# Patient Record
Sex: MALE | Race: WHITE | NOT HISPANIC OR LATINO | URBAN - METROPOLITAN AREA
[De-identification: names, ages, dates, MRNs, and addresses within clinical notes are randomized per-mention and may not be internally consistent; named-entity substitution may affect disease eponyms.]

---

## 2023-02-27 PROBLEM — F32.A ANXIETY AND DEPRESSION: Status: ACTIVE | Noted: 2023-02-27

## 2023-02-27 PROBLEM — E78.5 HYPERLIPIDEMIA: Status: ACTIVE | Noted: 2023-02-27

## 2023-02-27 PROBLEM — E66.9 CLASS 2 OBESITY WITH BODY MASS INDEX (BMI) OF 38.0 TO 38.9 IN ADULT: Status: ACTIVE | Noted: 2023-02-27

## 2023-02-27 PROBLEM — E66.812 CLASS 2 OBESITY WITH BODY MASS INDEX (BMI) OF 38.0 TO 38.9 IN ADULT: Status: ACTIVE | Noted: 2023-02-27

## 2023-02-27 PROBLEM — I10 BENIGN ESSENTIAL HYPERTENSION: Status: ACTIVE | Noted: 2023-02-27

## 2023-02-27 PROBLEM — F41.9 ANXIETY AND DEPRESSION: Status: ACTIVE | Noted: 2023-02-27

## 2023-02-27 PROBLEM — E55.9 VITAMIN D DEFICIENCY: Status: ACTIVE | Noted: 2023-02-27

## 2023-02-27 PROBLEM — G47.33 OBSTRUCTIVE SLEEP APNEA: Status: ACTIVE | Noted: 2023-02-27

## 2023-02-27 RX ORDER — ATORVASTATIN CALCIUM 10 MG/1
1 TABLET, FILM COATED ORAL NIGHTLY
COMMUNITY
Start: 2021-10-26 | End: 2023-05-11 | Stop reason: SDUPTHER

## 2023-02-27 RX ORDER — PHENTERMINE HYDROCHLORIDE 37.5 MG/1
37.5 TABLET ORAL EVERY MORNING
COMMUNITY
End: 2023-03-10 | Stop reason: SDUPTHER

## 2023-02-27 RX ORDER — ALPRAZOLAM 0.5 MG/1
0.5 TABLET ORAL DAILY PRN
COMMUNITY
End: 2023-08-14 | Stop reason: ALTCHOICE

## 2023-02-27 RX ORDER — ERGOCALCIFEROL 1.25 MG/1
50000 CAPSULE ORAL
COMMUNITY
Start: 2021-10-26 | End: 2023-05-11 | Stop reason: SDUPTHER

## 2023-02-27 RX ORDER — LISINOPRIL AND HYDROCHLOROTHIAZIDE 10; 12.5 MG/1; MG/1
1 TABLET ORAL EVERY MORNING
COMMUNITY
End: 2023-05-11 | Stop reason: SDUPTHER

## 2023-03-10 ENCOUNTER — OFFICE VISIT (OUTPATIENT)
Dept: PRIMARY CARE | Facility: CLINIC | Age: 48
End: 2023-03-10
Payer: COMMERCIAL

## 2023-03-10 VITALS
HEART RATE: 85 BPM | HEIGHT: 70 IN | OXYGEN SATURATION: 98 % | WEIGHT: 256 LBS | BODY MASS INDEX: 36.65 KG/M2 | SYSTOLIC BLOOD PRESSURE: 136 MMHG | TEMPERATURE: 97.5 F | DIASTOLIC BLOOD PRESSURE: 82 MMHG

## 2023-03-10 DIAGNOSIS — E55.9 VITAMIN D DEFICIENCY: ICD-10-CM

## 2023-03-10 DIAGNOSIS — I10 BENIGN ESSENTIAL HYPERTENSION: ICD-10-CM

## 2023-03-10 DIAGNOSIS — E66.09 CLASS 2 OBESITY DUE TO EXCESS CALORIES WITHOUT SERIOUS COMORBIDITY WITH BODY MASS INDEX (BMI) OF 38.0 TO 38.9 IN ADULT: Primary | ICD-10-CM

## 2023-03-10 DIAGNOSIS — E78.2 MIXED HYPERLIPIDEMIA: ICD-10-CM

## 2023-03-10 DIAGNOSIS — G47.33 OBSTRUCTIVE SLEEP APNEA: ICD-10-CM

## 2023-03-10 PROBLEM — F32.A ANXIETY AND DEPRESSION: Status: RESOLVED | Noted: 2023-02-27 | Resolved: 2023-03-10

## 2023-03-10 PROBLEM — F41.9 ANXIETY AND DEPRESSION: Status: RESOLVED | Noted: 2023-02-27 | Resolved: 2023-03-10

## 2023-03-10 PROCEDURE — 99213 OFFICE O/P EST LOW 20 MIN: CPT | Performed by: INTERNAL MEDICINE

## 2023-03-10 PROCEDURE — 3075F SYST BP GE 130 - 139MM HG: CPT | Performed by: INTERNAL MEDICINE

## 2023-03-10 PROCEDURE — 1036F TOBACCO NON-USER: CPT | Performed by: INTERNAL MEDICINE

## 2023-03-10 PROCEDURE — 3008F BODY MASS INDEX DOCD: CPT | Performed by: INTERNAL MEDICINE

## 2023-03-10 PROCEDURE — 3079F DIAST BP 80-89 MM HG: CPT | Performed by: INTERNAL MEDICINE

## 2023-03-10 RX ORDER — PHENTERMINE HYDROCHLORIDE 37.5 MG/1
37.5 TABLET ORAL EVERY MORNING
Qty: 30 TABLET | Refills: 0 | Status: SHIPPED | OUTPATIENT
Start: 2023-03-10 | End: 2023-04-06 | Stop reason: SDUPTHER

## 2023-03-10 ASSESSMENT — ENCOUNTER SYMPTOMS
DIARRHEA: 0
FEVER: 0
SHORTNESS OF BREATH: 0
NAUSEA: 0
WHEEZING: 0
VOMITING: 0
FATIGUE: 0
CHILLS: 0
PALPITATIONS: 0
CONSTIPATION: 0
COUGH: 0
SORE THROAT: 0

## 2023-03-10 NOTE — ASSESSMENT & PLAN NOTE
Advice your vitamin D level is low take vitamin C calcium plus vitamin D 50,000 unit one every week for 3 months and repeat testing or 3 months

## 2023-03-10 NOTE — ASSESSMENT & PLAN NOTE
Sleep apnea test endocrine work-up 1800-calorie diet 10,000 steps per day Adipex I personally reviewed the OARRS report for this patient. This report is scanned into the electronic medical record. I have considered  the risks of abuse, dependence, addiction and diversion. After discussion, patient does have a good understanding of the safety and  risks of this medication. I believe that it is clinically appropriate for this patient to be prescribed this medication.  See patient back in 6 weeks.

## 2023-03-10 NOTE — ASSESSMENT & PLAN NOTE
Low-salt diet continue lisinopril hydrochlorothiazide 10/12.5 watch for potassium uric acid BMP every 6 months

## 2023-03-10 NOTE — PROGRESS NOTES
Subjective   Patient ID: Calderon Hernandez is a 47 y.o. male who presents for Establish Care (Adipex-P refill ).    Assessment/Plan   Problem List Items Addressed This Visit          Nervous    Obstructive sleep apnea     Advised weight reduction CPAP            Circulatory    Benign essential hypertension     Low-salt diet continue lisinopril hydrochlorothiazide 10/12.5 watch for potassium uric acid BMP every 6 months            Endocrine/Metabolic    Class 2 obesity with body mass index (BMI) of 38.0 to 38.9 in adult - Primary     Sleep apnea test endocrine work-up 1800-calorie diet 10,000 steps per day Adipex I personally reviewed the OARRS report for this patient. This report is scanned into the electronic medical record. I have considered  the risks of abuse, dependence, addiction and diversion. After discussion, patient does have a good understanding of the safety and  risks of this medication. I believe that it is clinically appropriate for this patient to be prescribed this medication.  See patient back in 6 weeks.         Vitamin D deficiency     Advice your vitamin D level is low take vitamin C calcium plus vitamin D 50,000 unit one every week for 3 months and repeat testing or 3 months            Other    Hyperlipidemia     Continue Lipitor 10 mg a day low-fat diet            Source of history: Nurse, Medical personnel, Medical record, Patient.  History limitation: None.    HPI obesity arthritis hypertension hyperlipidemia sleep apnea with help with the diet exercise medication successfully lost 10 pounds fill feeling better    Negative for hypoxia headache    Negative for fall headache or chest pain or COVID-19    No Known Allergies    Current Outpatient Medications   Medication Sig Dispense Refill    ALPRAZolam (Xanax) 0.5 mg tablet Take 1 tablet (0.5 mg) by mouth once daily as needed.      atorvastatin (Lipitor) 10 mg tablet Take 1 tablet (10 mg) by mouth once daily at bedtime.      ergocalciferol  "(Vitamin D-2) 1.25 MG (66367 UT) capsule Take 1 capsule (50,000 Units) by mouth 1 (one) time per week.      lisinopriL-hydrochlorothiazide 10-12.5 mg tablet Take 1 tablet by mouth once daily in the morning.      phentermine (Adipex-P) 37.5 mg tablet Take 1 tablet (37.5 mg) by mouth once daily in the morning.       No current facility-administered medications for this visit.       Objective   Visit Vitals  /82 (BP Location: Right arm, Patient Position: Sitting)   Pulse 85   Temp 36.4 °C (97.5 °F)   Ht 1.778 m (5' 10\")   Wt 116 kg (256 lb)   SpO2 98%   BMI 36.73 kg/m²   Smoking Status Former   BSA 2.39 m²     Physical Exam  Constitutional:       General: He is not in acute distress.     Appearance: Normal appearance.   HENT:      Head: Normocephalic and atraumatic.      Nose: Nose normal.   Eyes:      Extraocular Movements: Extraocular movements intact.      Conjunctiva/sclera: Conjunctivae normal.   Cardiovascular:      Rate and Rhythm: Normal rate and regular rhythm.   Pulmonary:      Effort: Pulmonary effort is normal.      Breath sounds: Normal breath sounds.   Skin:     General: Skin is warm.   Neurological:      Mental Status: He is alert and oriented to person, place, and time.   Psychiatric:         Mood and Affect: Mood normal.         Behavior: Behavior normal.       Review of Systems   Constitutional:  Negative for chills, fatigue and fever.   HENT:  Negative for congestion, ear pain and sore throat.    Respiratory:  Negative for cough, shortness of breath and wheezing.    Cardiovascular:  Negative for chest pain, palpitations and leg swelling.   Gastrointestinal:  Negative for constipation, diarrhea, nausea and vomiting.       Legacy Encounter on 01/09/2023   Component Date Value Ref Range Status    Vitamin D, 25-Hydroxy 01/09/2023 19 (A)  ng/mL Final    PSA 01/09/2023 1.79  0.00 - 4.00 ng/mL Final    TSH 01/09/2023 1.39  0.44 - 3.98 mIU/L Final    Color, Urine 01/09/2023 STRAW  STRAW,YELLOW Final    " Appearance, Urine 01/09/2023 CLEAR  CLEAR Final    Specific Gravity, Urine 01/09/2023 1.003 (L)  1.005 - 1.035 Final    pH, Urine 01/09/2023 6.0  5.0 - 8.0 Final    Protein, Urine 01/09/2023 NEGATIVE  NEGATIVE mg/dL Final    Glucose, Urine 01/09/2023 NEGATIVE  NEGATIVE mg/dL Final    Blood, Urine 01/09/2023 NEGATIVE  NEGATIVE Final    Ketones, Urine 01/09/2023 NEGATIVE  NEGATIVE mg/dL Final    Bilirubin, Urine 01/09/2023 NEGATIVE  NEGATIVE Final    Urobilinogen, Urine 01/09/2023 <2.0  0.0 - 1.9 mg/dL Final    Nitrite, Urine 01/09/2023 NEGATIVE  NEGATIVE Final    Leukocyte Esterase, Urine 01/09/2023 NEGATIVE  NEGATIVE Final    Glucose 01/09/2023 93  74 - 99 mg/dL Final    Sodium 01/09/2023 139  136 - 145 mmol/L Final    Potassium 01/09/2023 4.3  3.5 - 5.3 mmol/L Final    Chloride 01/09/2023 102  98 - 107 mmol/L Final    Bicarbonate 01/09/2023 29  21 - 32 mmol/L Final    Anion Gap 01/09/2023 12  10 - 20 mmol/L Final    Urea Nitrogen 01/09/2023 15  6 - 23 mg/dL Final    Creatinine 01/09/2023 0.98  0.50 - 1.30 mg/dL Final    GFR MALE 01/09/2023 >90  >90 mL/min/1.73m2 Final    Calcium 01/09/2023 9.9  8.6 - 10.6 mg/dL Final    Albumin 01/09/2023 5.1 (H)  3.4 - 5.0 g/dL Final    Alkaline Phosphatase 01/09/2023 53  33 - 120 U/L Final    Total Protein 01/09/2023 7.5  6.4 - 8.2 g/dL Final    AST 01/09/2023 17  9 - 39 U/L Final    Total Bilirubin 01/09/2023 0.6  0.0 - 1.2 mg/dL Final    ALT (SGPT) 01/09/2023 30  10 - 52 U/L Final    WBC 01/09/2023 6.3  4.4 - 11.3 x10E9/L Final    nRBC 01/09/2023 0.0  0.0 - 0.0 /100 WBC Final    RBC 01/09/2023 5.35  4.50 - 5.90 x10E12/L Final    Hemoglobin 01/09/2023 16.2  13.5 - 17.5 g/dL Final    Hematocrit 01/09/2023 48.9  41.0 - 52.0 % Final    MCV 01/09/2023 91  80 - 100 fL Final    MCHC 01/09/2023 33.1  32.0 - 36.0 g/dL Final    Platelets 01/09/2023 260  150 - 450 x10E9/L Final    RDW 01/09/2023 12.4  11.5 - 14.5 % Final    Neutrophils % 01/09/2023 56.7  40.0 - 80.0 % Final    Immature  Granulocytes %, Automated 01/09/2023 0.5  0.0 - 0.9 % Final    Lymphocytes % 01/09/2023 31.1  13.0 - 44.0 % Final    Monocytes % 01/09/2023 8.8  2.0 - 10.0 % Final    Eosinophils % 01/09/2023 2.1  0.0 - 6.0 % Final    Basophils % 01/09/2023 0.8  0.0 - 2.0 % Final    Neutrophils Absolute 01/09/2023 3.57  1.20 - 7.70 x10E9/L Final    Lymphocytes Absolute 01/09/2023 1.95  1.20 - 4.80 x10E9/L Final    Monocytes Absolute 01/09/2023 0.55  0.10 - 1.00 x10E9/L Final    Eosinophils Absolute 01/09/2023 0.13  0.00 - 0.70 x10E9/L Final    Basophils Absolute 01/09/2023 0.05  0.00 - 0.10 x10E9/L Final    Cholesterol 01/09/2023 152  0 - 199 mg/dL Final    HDL 01/09/2023 55.0  mg/dL Final    Cholesterol/HDL Ratio 01/09/2023 2.8   Final    LDL 01/09/2023 72  0 - 99 mg/dL Final    VLDL 01/09/2023 25  0 - 40 mg/dL Final    Triglycerides 01/09/2023 126  0 - 149 mg/dL Final    ALBUMIN (MG/L) IN URINE 01/09/2023 <7.0  Not Established mg/L Final    Albumin/Creatine Ratio 01/09/2023 SEE COMMENT  0.0 - 30.0 ug/mg crt Final    Creatinine, Urine 01/09/2023 20.4  20.0 - 370.0 mg/dL Final    Hemoglobin A1C 01/09/2023 5.4  % Final    Estimated Average Glucose 01/09/2023 108  MG/DL Final       Radiology: Reviewed imaging in powerchart.  No results found.    Family History   Problem Relation Name Age of Onset    Stroke Mother      Other (vascular disorder) Father       Social History     Socioeconomic History    Marital status: Unknown     Spouse name: None    Number of children: None    Years of education: None    Highest education level: None   Occupational History    None   Tobacco Use    Smoking status: Former     Types: Cigarettes    Smokeless tobacco: Never   Vaping Use    Vaping status: None   Substance and Sexual Activity    Alcohol use: Yes     Comment: social    Drug use: Never    Sexual activity: None   Other Topics Concern    None   Social History Narrative    None     Social Determinants of Health     Financial Resource Strain: Not on  file   Food Insecurity: Not on file   Transportation Needs: Not on file   Physical Activity: Not on file   Stress: Not on file   Social Connections: Not on file   Intimate Partner Violence: Not on file   Housing Stability: Not on file     Past Medical History:   Diagnosis Date    Personal history of other endocrine, nutritional and metabolic disease 10/25/2021    History of obesity    Unspecified asthma, uncomplicated 10/25/2021    Acute asthmatic bronchitis     Past Surgical History:   Procedure Laterality Date    OTHER SURGICAL HISTORY  10/25/2021    Humerus fracture repair    OTHER SURGICAL HISTORY  10/25/2021    Inguinal hernia repair       Charting was completed using voice recognition technology and may include unintended errors.

## 2023-04-06 ENCOUNTER — OFFICE VISIT (OUTPATIENT)
Dept: PRIMARY CARE | Facility: CLINIC | Age: 48
End: 2023-04-06
Payer: COMMERCIAL

## 2023-04-06 VITALS
DIASTOLIC BLOOD PRESSURE: 82 MMHG | TEMPERATURE: 97.3 F | BODY MASS INDEX: 34.79 KG/M2 | HEIGHT: 70 IN | WEIGHT: 243 LBS | HEART RATE: 110 BPM | OXYGEN SATURATION: 98 % | SYSTOLIC BLOOD PRESSURE: 127 MMHG

## 2023-04-06 DIAGNOSIS — E55.9 VITAMIN D DEFICIENCY: ICD-10-CM

## 2023-04-06 DIAGNOSIS — E66.09 CLASS 2 OBESITY DUE TO EXCESS CALORIES WITHOUT SERIOUS COMORBIDITY WITH BODY MASS INDEX (BMI) OF 38.0 TO 38.9 IN ADULT: Primary | ICD-10-CM

## 2023-04-06 DIAGNOSIS — I10 BENIGN ESSENTIAL HYPERTENSION: ICD-10-CM

## 2023-04-06 PROBLEM — F51.02 ADJUSTMENT INSOMNIA: Status: ACTIVE | Noted: 2023-04-06

## 2023-04-06 PROBLEM — M77.9 TENDONITIS: Status: RESOLVED | Noted: 2017-10-19 | Resolved: 2023-04-06

## 2023-04-06 PROBLEM — R53.82 CHRONIC FATIGUE: Status: RESOLVED | Noted: 2023-04-06 | Resolved: 2023-04-06

## 2023-04-06 PROBLEM — S42.301A FRACTURE OF HUMERAL SHAFT, RIGHT, CLOSED: Status: RESOLVED | Noted: 2019-11-02 | Resolved: 2023-04-06

## 2023-04-06 PROBLEM — S32.009A CLOSED FRACTURE OF TRANSVERSE PROCESS OF LUMBAR VERTEBRA (MULTI): Status: RESOLVED | Noted: 2019-11-03 | Resolved: 2023-04-06

## 2023-04-06 PROBLEM — M72.2 PLANTAR FASCIITIS: Status: RESOLVED | Noted: 2017-10-19 | Resolved: 2023-04-06

## 2023-04-06 PROBLEM — S93.491A SPRAIN OF ANTERIOR TALOFIBULAR LIGAMENT OF RIGHT ANKLE: Status: RESOLVED | Noted: 2017-10-19 | Resolved: 2023-04-06

## 2023-04-06 PROBLEM — B35.3 TINEA PEDIS OF RIGHT FOOT: Status: RESOLVED | Noted: 2018-03-26 | Resolved: 2023-04-06

## 2023-04-06 PROCEDURE — 3008F BODY MASS INDEX DOCD: CPT | Performed by: INTERNAL MEDICINE

## 2023-04-06 PROCEDURE — 3079F DIAST BP 80-89 MM HG: CPT | Performed by: INTERNAL MEDICINE

## 2023-04-06 PROCEDURE — 99213 OFFICE O/P EST LOW 20 MIN: CPT | Performed by: INTERNAL MEDICINE

## 2023-04-06 PROCEDURE — 1036F TOBACCO NON-USER: CPT | Performed by: INTERNAL MEDICINE

## 2023-04-06 PROCEDURE — 3074F SYST BP LT 130 MM HG: CPT | Performed by: INTERNAL MEDICINE

## 2023-04-06 RX ORDER — PHENTERMINE HYDROCHLORIDE 37.5 MG/1
37.5 TABLET ORAL EVERY MORNING
Qty: 30 TABLET | Refills: 0 | Status: SHIPPED | OUTPATIENT
Start: 2023-04-06 | End: 2023-05-11 | Stop reason: ALTCHOICE

## 2023-04-06 NOTE — ASSESSMENT & PLAN NOTE
Patients BP readings reviewed and addressed, as we age our arteries turn stiffer and less elastic. Restricting salt consumption and staying physically fit with regular exercise regimen is the only way to keep our vasculature less tonic. Studies have shown that keeping ideal body wt, exercise routine about 140 to 150 minutes a week, eating variety of plant based diet and drinking plentiful water are quite helpful. Monitor BP twice or once a week at home and bring log to be reviewed by me. Uncontrolled BP has long term consequences including heart failure, myocardial infarction, accelerated atherosclerosis and kidney dysfunction. Therapy reviewed and explained.

## 2023-04-06 NOTE — ASSESSMENT & PLAN NOTE
I personally reviewed the OARRS report for this patient. This report is scanned into the electronic medical record. I have considered  the risks of abuse, dependence, addiction and diversion. After discussion, patient does have a good understanding of the safety and  risks of this medication. I believe that it is clinically appropriate for this patient to be prescribed this medication.  See patient back in 6 weeks.

## 2023-05-11 ENCOUNTER — LAB (OUTPATIENT)
Dept: LAB | Facility: LAB | Age: 48
End: 2023-05-11
Payer: COMMERCIAL

## 2023-05-11 ENCOUNTER — OFFICE VISIT (OUTPATIENT)
Dept: PRIMARY CARE | Facility: CLINIC | Age: 48
End: 2023-05-11
Payer: COMMERCIAL

## 2023-05-11 VITALS
TEMPERATURE: 97.6 F | OXYGEN SATURATION: 98 % | DIASTOLIC BLOOD PRESSURE: 84 MMHG | SYSTOLIC BLOOD PRESSURE: 131 MMHG | HEIGHT: 70 IN | BODY MASS INDEX: 34.65 KG/M2 | WEIGHT: 242 LBS | HEART RATE: 81 BPM

## 2023-05-11 DIAGNOSIS — E66.09 CLASS 2 OBESITY DUE TO EXCESS CALORIES WITHOUT SERIOUS COMORBIDITY WITH BODY MASS INDEX (BMI) OF 38.0 TO 38.9 IN ADULT: ICD-10-CM

## 2023-05-11 DIAGNOSIS — E66.09 CLASS 2 OBESITY DUE TO EXCESS CALORIES WITHOUT SERIOUS COMORBIDITY WITH BODY MASS INDEX (BMI) OF 38.0 TO 38.9 IN ADULT: Primary | ICD-10-CM

## 2023-05-11 DIAGNOSIS — E55.9 VITAMIN D DEFICIENCY: ICD-10-CM

## 2023-05-11 DIAGNOSIS — R73.9 HYPERGLYCEMIA: ICD-10-CM

## 2023-05-11 DIAGNOSIS — I10 HYPERTENSION, UNSPECIFIED TYPE: ICD-10-CM

## 2023-05-11 DIAGNOSIS — E78.2 MIXED HYPERLIPIDEMIA: ICD-10-CM

## 2023-05-11 DIAGNOSIS — I10 BENIGN ESSENTIAL HYPERTENSION: ICD-10-CM

## 2023-05-11 PROBLEM — G47.33 OBSTRUCTIVE SLEEP APNEA: Status: RESOLVED | Noted: 2023-02-27 | Resolved: 2023-05-11

## 2023-05-11 PROBLEM — F51.02 ADJUSTMENT INSOMNIA: Status: RESOLVED | Noted: 2023-04-06 | Resolved: 2023-05-11

## 2023-05-11 LAB
ESTIMATED AVERAGE GLUCOSE FOR HBA1C: 100 MG/DL
HEMOGLOBIN A1C/HEMOGLOBIN TOTAL IN BLOOD: 5.1 %

## 2023-05-11 PROCEDURE — 3075F SYST BP GE 130 - 139MM HG: CPT | Performed by: INTERNAL MEDICINE

## 2023-05-11 PROCEDURE — 36415 COLL VENOUS BLD VENIPUNCTURE: CPT

## 2023-05-11 PROCEDURE — 83036 HEMOGLOBIN GLYCOSYLATED A1C: CPT

## 2023-05-11 PROCEDURE — 82306 VITAMIN D 25 HYDROXY: CPT

## 2023-05-11 PROCEDURE — 99214 OFFICE O/P EST MOD 30 MIN: CPT | Performed by: INTERNAL MEDICINE

## 2023-05-11 PROCEDURE — 3079F DIAST BP 80-89 MM HG: CPT | Performed by: INTERNAL MEDICINE

## 2023-05-11 PROCEDURE — 1036F TOBACCO NON-USER: CPT | Performed by: INTERNAL MEDICINE

## 2023-05-11 PROCEDURE — 80307 DRUG TEST PRSMV CHEM ANLYZR: CPT

## 2023-05-11 PROCEDURE — 3008F BODY MASS INDEX DOCD: CPT | Performed by: INTERNAL MEDICINE

## 2023-05-11 RX ORDER — METFORMIN HYDROCHLORIDE 500 MG/1
500 TABLET ORAL
Qty: 30 TABLET | Refills: 11 | Status: SHIPPED | OUTPATIENT
Start: 2023-05-11 | End: 2023-12-12 | Stop reason: SDUPTHER

## 2023-05-11 RX ORDER — LISINOPRIL AND HYDROCHLOROTHIAZIDE 10; 12.5 MG/1; MG/1
1 TABLET ORAL EVERY MORNING
Qty: 90 TABLET | Refills: 3 | Status: SHIPPED | OUTPATIENT
Start: 2023-05-11 | End: 2023-08-14 | Stop reason: ALTCHOICE

## 2023-05-11 RX ORDER — ERGOCALCIFEROL 1.25 MG/1
50000 CAPSULE ORAL
Qty: 12 CAPSULE | Refills: 0 | Status: SHIPPED | OUTPATIENT
Start: 2023-05-11 | End: 2023-08-14 | Stop reason: ALTCHOICE

## 2023-05-11 RX ORDER — ATORVASTATIN CALCIUM 10 MG/1
10 TABLET, FILM COATED ORAL NIGHTLY
Qty: 90 TABLET | Refills: 3 | Status: SHIPPED | OUTPATIENT
Start: 2023-05-11 | End: 2023-12-12 | Stop reason: SDUPTHER

## 2023-05-11 NOTE — PROGRESS NOTES
Patient ID: Calderon Hernandez is a 48 y.o. male who presents for Follow-up.    Assessment/Plan     Problem List Items Addressed This Visit          Circulatory    Benign essential hypertension     Patients BP readings reviewed and addressed, as we age our arteries turn stiffer and less elastic. Restricting salt consumption and staying physically fit with regular exercise regimen is the only way to keep our vasculature less tonic. Studies have shown that keeping ideal body wt, exercise routine about 140 to 150 minutes a week, eating variety of plant based diet and drinking plentiful water are quite helpful. Monitor BP twice or once a week at home and bring log to be reviewed by me. Uncontrolled BP has long term consequences including heart failure, myocardial infarction, accelerated atherosclerosis and kidney dysfunction. Therapy reviewed and explained.           Relevant Medications    lisinopriL-hydrochlorothiazide 10-12.5 mg tablet       Endocrine/Metabolic    Class 2 obesity with body mass index (BMI) of 38.0 to 38.9 in adult - Primary     I spent <15 minutes face to face with individual providing recommendations for nutrition choices and exercise plan to help achieve weight reduction goals. Obesity is systemic disorder and it can bring devastating morbidities in furture. It is a matter of calorie gain and loss, keeping bodybank in negative calorie balance mode is the way to sustain weight loss.Diet has a big role in reducing excess body wt. Scheduled and well planned meals and food intake with watchfulness and understanding of calorie portion and distribution is key to understand. Bariatric surgery is another option if sustained wt loss is not achieved and faced with one or more comorbidities with morbid obesity. Weigh yourself twice a week to understand and follow wt loss goals.           Relevant Medications    metFORMIN (Glucophage) 500 mg tablet    Other Relevant Orders    Hemoglobin A1C    Vitamin D,  Total    Vitamin D deficiency     Advice your vitamin D level is low take vitamin C calcium plus vitamin D 50,000 unit one every week for 3 months and repeat testing or 3 months         Relevant Medications    ergocalciferol (Vitamin D-2) 1.25 MG (80156 UT) capsule    Other Relevant Orders    Hemoglobin A1C    Vitamin D, Total       Other    Hyperlipidemia    Relevant Medications    atorvastatin (Lipitor) 10 mg tablet     Other Visit Diagnoses       Hypertension, unspecified type        Relevant Orders    CT cardiac scoring wo IV contrast    Hyperglycemia        Relevant Medications    metFORMIN (Glucophage) 500 mg tablet    Other Relevant Orders    Hemoglobin A1C          Patient was evaluated today, problem list was reviewed, problems and concerns addressed, Rx list reviewed and updated, lab and tests were noted and reviewed. Life style changes were discussed, always it works better if we eat plant based diet and plenty of fibres and roughage. Consume adequate amount of water and avoid alcohol, light to moderate physical activities and stress reduction are always beneficial for ongoing physical well being. Do not forget to have 6 to 7 hours of sleep regularly and avoid late night fredo screen exposure.   Source of history: Nurse, Medical personnel, Medical record, Patient.  History limitation: None.      HPI  48-year-old patient have anxiety depression metabolic syndrome hypertension hyperlipidemia diabetes insulin resistance syndrome complaining arthralgia myalgia fatigue tired weakness    Negative for headache chest pain hematuria rectal bleeding or suicide        No Known Allergies    Medications    Current Outpatient Medications   Medication Sig Dispense Refill    ALPRAZolam (Xanax) 0.5 mg tablet Take 1 tablet (0.5 mg) by mouth once daily as needed.      atorvastatin (Lipitor) 10 mg tablet Take 1 tablet (10 mg) by mouth once daily at bedtime. 90 tablet 3    ergocalciferol (Vitamin D-2) 1.25 MG (62157 UT) capsule  "Take 1 capsule (50,000 Units) by mouth 1 (one) time per week. 12 capsule 0    lisinopriL-hydrochlorothiazide 10-12.5 mg tablet Take 1 tablet by mouth once daily in the morning. 90 tablet 3    metFORMIN (Glucophage) 500 mg tablet Take 1 tablet (500 mg) by mouth once daily with a meal. 30 tablet 11     No current facility-administered medications for this visit.       Objective   Visit Vitals  /84 (BP Location: Left arm, Patient Position: Sitting, BP Cuff Size: Large adult)   Pulse 81   Temp 36.4 °C (97.6 °F)   Ht 1.778 m (5' 10\")   Wt 110 kg (242 lb)   SpO2 98%   BMI 34.72 kg/m²   Smoking Status Former   BSA 2.33 m²       PHYSICAL EXAM  General: NAD. NCAT. Aox3 obesity  HEENT: PERRLA. EOMI. MMM. Nares patent bl.  Cardiovascular: RRR. No MRG. S1/S2 wnl.   Respiratory: CTABL. No acute respiratory distress.   GI: Soft, NT abdomen. BS present x 4.   : No CVAT BL  MSK: ROM x 4. CTLS non-tender.  Arthralgia  Extremities: No edema. Cap refill < 2 sec.   Skin: No rashes or bruises.   Neuro: Aox3. Cranial Nerves grossly intact. Motor/sensory wnl.  Neurology  Psych: Mood wnl.      Physical Exam  Normal foot exam  ROS  Constitutional: Denies fevers, chills, fatigue, weight loss/gain  HEENT: Denies HA, vision changes, hearing loss, sore throat  Cardiac: Denies CP, palpitations, edema  Respiratory: Denies SOB, cough, pleuritic chest pain, PND, orthopnea  GI: Denies N/V/D, abd pain, constipation, black/bloody stools  : Denies urinary changes, frequency, hematuria, urgency, retention, flank pain  MSK: Denies joint pain, joint swelling, back pain, neck pain, extremity pain  Neuro: Denies numbness, weakness, tingling    Immunization History   Administered Date(s) Administered    Influenza, Unspecified 09/29/2010       No visits with results within 4 Month(s) from this visit.   Latest known visit with results is:   Legacy Encounter on 01/09/2023   Component Date Value Ref Range Status    Vitamin D, 25-Hydroxy 01/09/2023 19 " (A)  ng/mL Final    PSA 01/09/2023 1.79  0.00 - 4.00 ng/mL Final    TSH 01/09/2023 1.39  0.44 - 3.98 mIU/L Final    Color, Urine 01/09/2023 STRAW  STRAW,YELLOW Final    Appearance, Urine 01/09/2023 CLEAR  CLEAR Final    Specific Gravity, Urine 01/09/2023 1.003 (L)  1.005 - 1.035 Final    pH, Urine 01/09/2023 6.0  5.0 - 8.0 Final    Protein, Urine 01/09/2023 NEGATIVE  NEGATIVE mg/dL Final    Glucose, Urine 01/09/2023 NEGATIVE  NEGATIVE mg/dL Final    Blood, Urine 01/09/2023 NEGATIVE  NEGATIVE Final    Ketones, Urine 01/09/2023 NEGATIVE  NEGATIVE mg/dL Final    Bilirubin, Urine 01/09/2023 NEGATIVE  NEGATIVE Final    Urobilinogen, Urine 01/09/2023 <2.0  0.0 - 1.9 mg/dL Final    Nitrite, Urine 01/09/2023 NEGATIVE  NEGATIVE Final    Leukocyte Esterase, Urine 01/09/2023 NEGATIVE  NEGATIVE Final    Glucose 01/09/2023 93  74 - 99 mg/dL Final    Sodium 01/09/2023 139  136 - 145 mmol/L Final    Potassium 01/09/2023 4.3  3.5 - 5.3 mmol/L Final    Chloride 01/09/2023 102  98 - 107 mmol/L Final    Bicarbonate 01/09/2023 29  21 - 32 mmol/L Final    Anion Gap 01/09/2023 12  10 - 20 mmol/L Final    Urea Nitrogen 01/09/2023 15  6 - 23 mg/dL Final    Creatinine 01/09/2023 0.98  0.50 - 1.30 mg/dL Final    GFR MALE 01/09/2023 >90  >90 mL/min/1.73m2 Final    Calcium 01/09/2023 9.9  8.6 - 10.6 mg/dL Final    Albumin 01/09/2023 5.1 (H)  3.4 - 5.0 g/dL Final    Alkaline Phosphatase 01/09/2023 53  33 - 120 U/L Final    Total Protein 01/09/2023 7.5  6.4 - 8.2 g/dL Final    AST 01/09/2023 17  9 - 39 U/L Final    Total Bilirubin 01/09/2023 0.6  0.0 - 1.2 mg/dL Final    ALT (SGPT) 01/09/2023 30  10 - 52 U/L Final    WBC 01/09/2023 6.3  4.4 - 11.3 x10E9/L Final    nRBC 01/09/2023 0.0  0.0 - 0.0 /100 WBC Final    RBC 01/09/2023 5.35  4.50 - 5.90 x10E12/L Final    Hemoglobin 01/09/2023 16.2  13.5 - 17.5 g/dL Final    Hematocrit 01/09/2023 48.9  41.0 - 52.0 % Final    MCV 01/09/2023 91  80 - 100 fL Final    MCHC 01/09/2023 33.1  32.0 - 36.0 g/dL  Final    Platelets 01/09/2023 260  150 - 450 x10E9/L Final    RDW 01/09/2023 12.4  11.5 - 14.5 % Final    Neutrophils % 01/09/2023 56.7  40.0 - 80.0 % Final    Immature Granulocytes %, Automated 01/09/2023 0.5  0.0 - 0.9 % Final    Lymphocytes % 01/09/2023 31.1  13.0 - 44.0 % Final    Monocytes % 01/09/2023 8.8  2.0 - 10.0 % Final    Eosinophils % 01/09/2023 2.1  0.0 - 6.0 % Final    Basophils % 01/09/2023 0.8  0.0 - 2.0 % Final    Neutrophils Absolute 01/09/2023 3.57  1.20 - 7.70 x10E9/L Final    Lymphocytes Absolute 01/09/2023 1.95  1.20 - 4.80 x10E9/L Final    Monocytes Absolute 01/09/2023 0.55  0.10 - 1.00 x10E9/L Final    Eosinophils Absolute 01/09/2023 0.13  0.00 - 0.70 x10E9/L Final    Basophils Absolute 01/09/2023 0.05  0.00 - 0.10 x10E9/L Final    Cholesterol 01/09/2023 152  0 - 199 mg/dL Final    HDL 01/09/2023 55.0  mg/dL Final    Cholesterol/HDL Ratio 01/09/2023 2.8   Final    LDL 01/09/2023 72  0 - 99 mg/dL Final    VLDL 01/09/2023 25  0 - 40 mg/dL Final    Triglycerides 01/09/2023 126  0 - 149 mg/dL Final    ALBUMIN (MG/L) IN URINE 01/09/2023 <7.0  Not Established mg/L Final    Albumin/Creatine Ratio 01/09/2023 SEE COMMENT  0.0 - 30.0 ug/mg crt Final    Creatinine, Urine 01/09/2023 20.4  20.0 - 370.0 mg/dL Final    Hemoglobin A1C 01/09/2023 5.4  % Final    Estimated Average Glucose 01/09/2023 108  MG/DL Final       Radiology: Reviewed imaging in powerchart.  No results found.    Family History   Problem Relation Name Age of Onset    Stroke Mother      Other (vascular disorder) Father       Social History     Socioeconomic History    Marital status: Unknown     Spouse name: None    Number of children: None    Years of education: None    Highest education level: None   Occupational History    None   Tobacco Use    Smoking status: Former     Types: Cigarettes    Smokeless tobacco: Never   Vaping Use    Vaping status: None   Substance and Sexual Activity    Alcohol use: Yes     Comment: social    Drug use:  Never    Sexual activity: None   Other Topics Concern    None   Social History Narrative    None     Social Determinants of Health     Financial Resource Strain: Not on file   Food Insecurity: Not on file   Transportation Needs: Not on file   Physical Activity: Not on file   Stress: Not on file   Social Connections: Not on file   Intimate Partner Violence: Not on file   Housing Stability: Not on file     Past Medical History:   Diagnosis Date    Chronic fatigue 04/06/2023    Closed fracture of transverse process of lumbar vertebra (CMS/HCC) 11/03/2019    Fracture of humeral shaft, right, closed 11/02/2019    Personal history of other endocrine, nutritional and metabolic disease 10/25/2021    History of obesity    Plantar fasciitis 10/19/2017    Sprain of anterior talofibular ligament of right ankle 10/19/2017    Tendonitis 10/19/2017    Tinea pedis of right foot 03/26/2018    Unspecified asthma, uncomplicated 10/25/2021    Acute asthmatic bronchitis     Past Surgical History:   Procedure Laterality Date    OTHER SURGICAL HISTORY  10/25/2021    Humerus fracture repair    OTHER SURGICAL HISTORY  10/25/2021    Inguinal hernia repair     * Cannot find OR log *    Charting was completed using voice recognition technology and may include unintended errors.

## 2023-05-12 LAB
AMPHETAMINE (PRESENCE) IN URINE BY SCREEN METHOD: NORMAL
BARBITURATES PRESENCE IN URINE BY SCREEN METHOD: NORMAL
BENZODIAZEPINE (PRESENCE) IN URINE BY SCREEN METHOD: NORMAL
CALCIDIOL (25 OH VITAMIN D3) (NG/ML) IN SER/PLAS: 55 NG/ML
CANNABINOIDS IN URINE BY SCREEN METHOD: NORMAL
COCAINE (PRESENCE) IN URINE BY SCREEN METHOD: NORMAL
DRUG SCREEN COMMENT URINE: NORMAL
FENTANYL URINE: NORMAL
METHADONE (PRESENCE) IN URINE BY SCREEN METHOD: NORMAL
OPIATES (PRESENCE) IN URINE BY SCREEN METHOD: NORMAL
OXYCODONE (PRESENCE) IN URINE BY SCREEN METHOD: NORMAL
PHENCYCLIDINE (PRESENCE) IN URINE BY SCREEN METHOD: NORMAL

## 2023-08-14 ENCOUNTER — OFFICE VISIT (OUTPATIENT)
Dept: PRIMARY CARE | Facility: CLINIC | Age: 48
End: 2023-08-14
Payer: COMMERCIAL

## 2023-08-14 ENCOUNTER — LAB (OUTPATIENT)
Dept: LAB | Facility: LAB | Age: 48
End: 2023-08-14
Payer: COMMERCIAL

## 2023-08-14 VITALS
HEART RATE: 80 BPM | OXYGEN SATURATION: 98 % | WEIGHT: 249.4 LBS | DIASTOLIC BLOOD PRESSURE: 86 MMHG | HEIGHT: 70 IN | TEMPERATURE: 97.5 F | BODY MASS INDEX: 35.71 KG/M2 | SYSTOLIC BLOOD PRESSURE: 142 MMHG

## 2023-08-14 DIAGNOSIS — I10 BENIGN ESSENTIAL HYPERTENSION: ICD-10-CM

## 2023-08-14 DIAGNOSIS — R73.9 HYPERGLYCEMIA: ICD-10-CM

## 2023-08-14 DIAGNOSIS — F90.0 ATTENTION DEFICIT HYPERACTIVITY DISORDER (ADHD), PREDOMINANTLY INATTENTIVE TYPE: Primary | ICD-10-CM

## 2023-08-14 DIAGNOSIS — E78.2 MIXED HYPERLIPIDEMIA: ICD-10-CM

## 2023-08-14 DIAGNOSIS — E66.09 CLASS 2 OBESITY DUE TO EXCESS CALORIES WITHOUT SERIOUS COMORBIDITY WITH BODY MASS INDEX (BMI) OF 38.0 TO 38.9 IN ADULT: ICD-10-CM

## 2023-08-14 DIAGNOSIS — T46.4X5A ADVERSE EFFECT OF LISINOPRIL, INITIAL ENCOUNTER: ICD-10-CM

## 2023-08-14 DIAGNOSIS — F90.0 ATTENTION DEFICIT HYPERACTIVITY DISORDER (ADHD), PREDOMINANTLY INATTENTIVE TYPE: ICD-10-CM

## 2023-08-14 LAB
ALBUMIN (MG/L) IN URINE: 16.4 MG/L
ALBUMIN/CREATININE (UG/MG) IN URINE: 22 UG/MG CRT (ref 0–30)
AMPHETAMINE (PRESENCE) IN URINE BY SCREEN METHOD: NORMAL
BARBITURATES PRESENCE IN URINE BY SCREEN METHOD: NORMAL
BENZODIAZEPINE (PRESENCE) IN URINE BY SCREEN METHOD: NORMAL
CANNABINOIDS IN URINE BY SCREEN METHOD: NORMAL
COCAINE (PRESENCE) IN URINE BY SCREEN METHOD: NORMAL
CREATININE (MG/DL) IN URINE: 74.7 MG/DL (ref 20–370)
DRUG SCREEN COMMENT URINE: NORMAL
FENTANYL URINE: NORMAL
METHADONE (PRESENCE) IN URINE BY SCREEN METHOD: NORMAL
OPIATES (PRESENCE) IN URINE BY SCREEN METHOD: NORMAL
OXYCODONE (PRESENCE) IN URINE BY SCREEN METHOD: NORMAL
PHENCYCLIDINE (PRESENCE) IN URINE BY SCREEN METHOD: NORMAL

## 2023-08-14 PROCEDURE — 82043 UR ALBUMIN QUANTITATIVE: CPT

## 2023-08-14 PROCEDURE — 3008F BODY MASS INDEX DOCD: CPT | Performed by: INTERNAL MEDICINE

## 2023-08-14 PROCEDURE — 3077F SYST BP >= 140 MM HG: CPT | Performed by: INTERNAL MEDICINE

## 2023-08-14 PROCEDURE — 82570 ASSAY OF URINE CREATININE: CPT

## 2023-08-14 PROCEDURE — 3079F DIAST BP 80-89 MM HG: CPT | Performed by: INTERNAL MEDICINE

## 2023-08-14 PROCEDURE — 80307 DRUG TEST PRSMV CHEM ANLYZR: CPT

## 2023-08-14 PROCEDURE — 99214 OFFICE O/P EST MOD 30 MIN: CPT | Performed by: INTERNAL MEDICINE

## 2023-08-14 PROCEDURE — 1036F TOBACCO NON-USER: CPT | Performed by: INTERNAL MEDICINE

## 2023-08-14 RX ORDER — OLMESARTAN MEDOXOMIL AND HYDROCHLOROTHIAZIDE 40/25 40; 25 MG/1; MG/1
1 TABLET ORAL DAILY
Qty: 90 TABLET | Refills: 1 | Status: SHIPPED | OUTPATIENT
Start: 2023-08-14 | End: 2023-12-12 | Stop reason: SDUPTHER

## 2023-08-14 RX ORDER — DEXTROAMPHETAMINE SACCHARATE, AMPHETAMINE ASPARTATE MONOHYDRATE, DEXTROAMPHETAMINE SULFATE AND AMPHETAMINE SULFATE 2.5; 2.5; 2.5; 2.5 MG/1; MG/1; MG/1; MG/1
10 CAPSULE, EXTENDED RELEASE ORAL EVERY MORNING
Qty: 30 CAPSULE | Refills: 0 | Status: SHIPPED | OUTPATIENT
Start: 2023-08-14 | End: 2023-10-03 | Stop reason: SDUPTHER

## 2023-08-14 NOTE — PROGRESS NOTES
Subjective   Patient ID: Calderon Hernandez is a 48 y.o. male who presents for Follow-up (3 months ).    Assessment/Plan     Problem List Items Addressed This Visit       Benign essential hypertension     Patients BP readings reviewed and addressed, as we age our arteries turn stiffer and less elastic. Restricting salt consumption and staying physically fit with regular exercise regimen is the only way to keep our vasculature less tonic. Studies have shown that keeping ideal body wt, exercise routine about 140 to 150 minutes a week, eating variety of plant based diet and drinking plentiful water are quite helpful. Monitor BP twice or once a week at home and bring log to be reviewed by me. Uncontrolled BP has long term consequences including heart failure, myocardial infarction, accelerated atherosclerosis and kidney dysfunction. Therapy reviewed and explained.           Relevant Orders    Albumin , Urine Random    Comprehensive Metabolic Panel    Hemoglobin A1C    Lipid Panel    TSH with reflex to Free T4 if abnormal    Class 2 obesity with body mass index (BMI) of 38.0 to 38.9 in adult    Relevant Orders    Albumin , Urine Random    Comprehensive Metabolic Panel    Hemoglobin A1C    Lipid Panel    TSH with reflex to Free T4 if abnormal    Hyperlipidemia    Relevant Orders    Albumin , Urine Random    Comprehensive Metabolic Panel    Hemoglobin A1C    Lipid Panel    TSH with reflex to Free T4 if abnormal    Attention deficit hyperactivity disorder (ADHD), predominantly inattentive type - Primary     I personally reviewed the OARRS report for this patient. This report is scanned into the electronic medical record. I have considered  the risks of abuse, dependence, addiction and diversion. After discussion, patient does have a good understanding of the safety and  risks of this medication. I believe that it is clinically appropriate for this patient to be prescribed this medication.  See patient back in 6 weeks.          Relevant Orders    Drug Screen, Urine With Reflex to Confirmation    Hyperglycemia    Relevant Orders    Albumin , Urine Random    Comprehensive Metabolic Panel    Hemoglobin A1C    Lipid Panel    TSH with reflex to Free T4 if abnormal    Adverse effect of lisinopril     Dry cough irritation of the throat discontinue lisinopril replace with Benicar        Patient was evaluated today, problem list was reviewed, problems and concerns addressed, Rx list reviewed and updated, lab and tests were noted and reviewed. Life style changes were discussed, always it works better if we eat plant based diet and plenty of fibres and roughage. Consume adequate amount of water and avoid alcohol, light to moderate physical activities and stress reduction are always beneficial for ongoing physical well being. Do not forget to have 6 to 7 hours of sleep regularly and avoid late night fredo screen exposure.      HPI  This is a 48-year-old patient have metabolic syndrome hypertension hyperlipidemia hyperglycemia obesity complaining ADD ADHD poor focus attention without any behavior problem    Taking lisinopril advised to get a sore throat cough tingling numbness in the throat and lips area    Negative for choking angioedema    Negative for suicide    Negative for chemical abuse    Aggravating factor none associated problem family history of mental health problem  Past Medical History:   Diagnosis Date    Chronic fatigue 04/06/2023    Closed fracture of transverse process of lumbar vertebra (CMS/HCC) 11/03/2019    Fracture of humeral shaft, right, closed 11/02/2019    Personal history of other endocrine, nutritional and metabolic disease 10/25/2021    History of obesity    Plantar fasciitis 10/19/2017    Sprain of anterior talofibular ligament of right ankle 10/19/2017    Tendonitis 10/19/2017    Tinea pedis of right foot 03/26/2018    Unspecified asthma, uncomplicated 10/25/2021    Acute asthmatic bronchitis     Past Surgical History:  "  Procedure Laterality Date    OTHER SURGICAL HISTORY  10/25/2021    Humerus fracture repair    OTHER SURGICAL HISTORY  10/25/2021    Inguinal hernia repair     No Known Allergies  Current Outpatient Medications   Medication Sig Dispense Refill    atorvastatin (Lipitor) 10 mg tablet Take 1 tablet (10 mg) by mouth once daily at bedtime. 90 tablet 3    lisinopriL-hydrochlorothiazide 10-12.5 mg tablet Take 1 tablet by mouth once daily in the morning. 90 tablet 3    metFORMIN (Glucophage) 500 mg tablet Take 1 tablet (500 mg) by mouth once daily with a meal. 30 tablet 11     No current facility-administered medications for this visit.     Family History   Problem Relation Name Age of Onset    Stroke Mother      Other (vascular disorder) Father       Social History     Socioeconomic History    Marital status: Unknown     Spouse name: None    Number of children: None    Years of education: None    Highest education level: None   Occupational History    None   Tobacco Use    Smoking status: Former     Types: Cigarettes    Smokeless tobacco: Never   Substance and Sexual Activity    Alcohol use: Yes     Comment: social    Drug use: Never    Sexual activity: None   Other Topics Concern    None   Social History Narrative    None     Social Determinants of Health     Financial Resource Strain: Not on file   Food Insecurity: Not on file   Transportation Needs: Not on file   Physical Activity: Not on file   Stress: Not on file   Social Connections: Not on file   Intimate Partner Violence: Not on file   Housing Stability: Not on file     Immunization History   Administered Date(s) Administered    Influenza, Unspecified 09/29/2010       Review of Systems  Review of systems is otherwise negative unless stated above or in history of present illness.    Objective   Visit Vitals  /86 (BP Location: Left arm, Patient Position: Sitting, BP Cuff Size: Large adult)   Pulse 80   Temp 36.4 °C (97.5 °F)   Ht 1.778 m (5' 10\")   Wt 113 kg " (249 lb 6.4 oz)   SpO2 98%   BMI 35.79 kg/m²   Smoking Status Former   BSA 2.36 m²     Physical Exam  Constitutional:       General: not in acute distress.   HENT:      Head: Normocephalic and atraumatic.      Nose: Nose normal.   Eyes:      Extraocular Movements: Extraocular movements intact.      Conjunctiva/sclera: Conjunctivae normal.   Cardiovascular:      Rate and Rhythm: Normal rate ,  No M/R/G  Pulmonary:      Effort: Pulmonary effort is normal.      Breath sounds: Normal, Bilat Equal AE  Skin:     General: Skin is warm.   Neurological:      Mental Status: He is alert and oriented to person, place, and time.   Psychiatric:         Mood and Affect: Mood normal.         Behavior: Behavior normal.   Musculoskeletal   FROM in all extremitirs,  Joint-no swelling or tenderness    Lab on 05/11/2023   Component Date Value Ref Range Status    Hemoglobin A1C 05/11/2023 5.1  % Final    Estimated Average Glucose 05/11/2023 100  MG/DL Final    Vitamin D, 25-Hydroxy 05/11/2023 55  ng/mL Final       Radiology: Reviewed imaging in powerchart.  No results found.      Charting was completed using voice recognition technology and may include unintended errors.

## 2023-08-17 ENCOUNTER — TELEPHONE (OUTPATIENT)
Dept: PRIMARY CARE | Facility: CLINIC | Age: 48
End: 2023-08-17
Payer: COMMERCIAL

## 2023-08-17 NOTE — TELEPHONE ENCOUNTER
Pt called for a adderall Rx refill but he just ad one on 08/14/23  Received by pharmacy at 0811 8/14    Called to inform pt-and he will call pharmacy to see if it is ready for   I informed him of pharmacy shortage of adderall and to call right away-he verbalized understanding

## 2023-08-18 ENCOUNTER — TELEPHONE (OUTPATIENT)
Dept: PRIMARY CARE | Facility: CLINIC | Age: 48
End: 2023-08-18
Payer: COMMERCIAL

## 2023-09-26 ENCOUNTER — TELEPHONE (OUTPATIENT)
Dept: PRIMARY CARE | Facility: CLINIC | Age: 48
End: 2023-09-26
Payer: COMMERCIAL

## 2023-09-26 NOTE — TELEPHONE ENCOUNTER
Tried calling pt regarding CT calcium scoring- no answer unable to leave message.   Per Dr. Galo - take 1 baby aspirin 81 mg daily.

## 2023-10-03 ENCOUNTER — LAB (OUTPATIENT)
Dept: LAB | Facility: LAB | Age: 48
End: 2023-10-03
Payer: COMMERCIAL

## 2023-10-03 ENCOUNTER — OFFICE VISIT (OUTPATIENT)
Dept: PRIMARY CARE | Facility: CLINIC | Age: 48
End: 2023-10-03
Payer: COMMERCIAL

## 2023-10-03 VITALS
BODY MASS INDEX: 34.22 KG/M2 | HEIGHT: 70 IN | SYSTOLIC BLOOD PRESSURE: 106 MMHG | HEART RATE: 87 BPM | DIASTOLIC BLOOD PRESSURE: 74 MMHG | OXYGEN SATURATION: 97 % | TEMPERATURE: 97 F | WEIGHT: 239 LBS

## 2023-10-03 DIAGNOSIS — F90.0 ATTENTION DEFICIT HYPERACTIVITY DISORDER (ADHD), PREDOMINANTLY INATTENTIVE TYPE: Primary | ICD-10-CM

## 2023-10-03 DIAGNOSIS — E66.09 CLASS 2 OBESITY DUE TO EXCESS CALORIES WITHOUT SERIOUS COMORBIDITY WITH BODY MASS INDEX (BMI) OF 38.0 TO 38.9 IN ADULT: ICD-10-CM

## 2023-10-03 DIAGNOSIS — E78.2 MIXED HYPERLIPIDEMIA: ICD-10-CM

## 2023-10-03 DIAGNOSIS — I10 BENIGN ESSENTIAL HYPERTENSION: ICD-10-CM

## 2023-10-03 DIAGNOSIS — F90.0 ATTENTION DEFICIT HYPERACTIVITY DISORDER (ADHD), PREDOMINANTLY INATTENTIVE TYPE: ICD-10-CM

## 2023-10-03 PROBLEM — E78.5 HYPERLIPIDEMIA: Status: RESOLVED | Noted: 2023-02-27 | Resolved: 2023-10-03

## 2023-10-03 PROBLEM — E55.9 VITAMIN D DEFICIENCY: Status: RESOLVED | Noted: 2023-02-27 | Resolved: 2023-10-03

## 2023-10-03 PROBLEM — T46.4X5A ADVERSE EFFECT OF LISINOPRIL: Status: RESOLVED | Noted: 2023-08-14 | Resolved: 2023-10-03

## 2023-10-03 PROBLEM — R73.9 HYPERGLYCEMIA: Status: RESOLVED | Noted: 2023-08-14 | Resolved: 2023-10-03

## 2023-10-03 LAB
ALBUMIN SERPL BCP-MCNC: 5.1 G/DL (ref 3.4–5)
ALP SERPL-CCNC: 46 U/L (ref 33–120)
ALT SERPL W P-5'-P-CCNC: 22 U/L (ref 10–52)
ANION GAP SERPL CALC-SCNC: 16 MMOL/L (ref 10–20)
AST SERPL W P-5'-P-CCNC: 15 U/L (ref 9–39)
BILIRUB SERPL-MCNC: 0.7 MG/DL (ref 0–1.2)
BUN SERPL-MCNC: 24 MG/DL (ref 6–23)
CALCIUM SERPL-MCNC: 10.3 MG/DL (ref 8.6–10.6)
CHLORIDE SERPL-SCNC: 99 MMOL/L (ref 98–107)
CHOLEST SERPL-MCNC: 139 MG/DL (ref 0–199)
CHOLESTEROL/HDL RATIO: 2.6
CO2 SERPL-SCNC: 27 MMOL/L (ref 21–32)
CREAT SERPL-MCNC: 0.92 MG/DL (ref 0.5–1.3)
GFR SERPL CREATININE-BSD FRML MDRD: >90 ML/MIN/1.73M*2
GLUCOSE SERPL-MCNC: 96 MG/DL (ref 74–99)
HDLC SERPL-MCNC: 54.1 MG/DL
LDLC SERPL CALC-MCNC: 68 MG/DL (ref 140–190)
NON HDL CHOLESTEROL: 85 MG/DL (ref 0–149)
POTASSIUM SERPL-SCNC: 4.3 MMOL/L (ref 3.5–5.3)
PROT SERPL-MCNC: 7.6 G/DL (ref 6.4–8.2)
SODIUM SERPL-SCNC: 138 MMOL/L (ref 136–145)
TRIGL SERPL-MCNC: 86 MG/DL (ref 0–149)
TSH SERPL-ACNC: 0.93 MIU/L (ref 0.44–3.98)
VLDL: 17 MG/DL (ref 0–40)

## 2023-10-03 PROCEDURE — 3078F DIAST BP <80 MM HG: CPT | Performed by: INTERNAL MEDICINE

## 2023-10-03 PROCEDURE — 36415 COLL VENOUS BLD VENIPUNCTURE: CPT

## 2023-10-03 PROCEDURE — 99214 OFFICE O/P EST MOD 30 MIN: CPT | Performed by: INTERNAL MEDICINE

## 2023-10-03 PROCEDURE — 3074F SYST BP LT 130 MM HG: CPT | Performed by: INTERNAL MEDICINE

## 2023-10-03 PROCEDURE — 3008F BODY MASS INDEX DOCD: CPT | Performed by: INTERNAL MEDICINE

## 2023-10-03 PROCEDURE — 1036F TOBACCO NON-USER: CPT | Performed by: INTERNAL MEDICINE

## 2023-10-03 RX ORDER — DEXTROAMPHETAMINE SACCHARATE, AMPHETAMINE ASPARTATE MONOHYDRATE, DEXTROAMPHETAMINE SULFATE AND AMPHETAMINE SULFATE 2.5; 2.5; 2.5; 2.5 MG/1; MG/1; MG/1; MG/1
10 CAPSULE, EXTENDED RELEASE ORAL EVERY MORNING
Qty: 30 CAPSULE | Refills: 0 | Status: SHIPPED | OUTPATIENT
Start: 2023-10-03 | End: 2023-12-12 | Stop reason: SDUPTHER

## 2023-10-03 NOTE — ASSESSMENT & PLAN NOTE
Patients BP readings reviewed and addressed, as we age our arteries turn stiffer and less elastic. Restricting salt consumption and staying physically fit with regular exercise regimen is the only way to keep our vasculature less tonic. Studies have shown that keeping ideal body wt, exercise routine about 140 to 150 minutes a week, eating variety of plant based diet and drinking plentiful water are quite helpful. Monitor BP twice or once a week at home and bring log to be reviewed by me. Uncontrolled BP has long term consequences including heart failure, myocardial infarction, accelerated atherosclerosis and kidney dysfunction. Therapy reviewed and explained.     Went in to start IV, pt says she told the doctor she wanted to leave.

## 2023-10-03 NOTE — PROGRESS NOTES
Subjective   Patient ID: Calderon Hernandez is a 48 y.o. male who presents for Follow-up (On CT scan ).    Assessment/Plan     Problem List Items Addressed This Visit       Benign essential hypertension     Patients BP readings reviewed and addressed, as we age our arteries turn stiffer and less elastic. Restricting salt consumption and staying physically fit with regular exercise regimen is the only way to keep our vasculature less tonic. Studies have shown that keeping ideal body wt, exercise routine about 140 to 150 minutes a week, eating variety of plant based diet and drinking plentiful water are quite helpful. Monitor BP twice or once a week at home and bring log to be reviewed by me. Uncontrolled BP has long term consequences including heart failure, myocardial infarction, accelerated atherosclerosis and kidney dysfunction. Therapy reviewed and explained.           Relevant Orders    CBC and Auto Differential    Comprehensive Metabolic Panel    Lipid Panel    TSH with reflex to Free T4 if abnormal    Class 2 obesity with body mass index (BMI) of 38.0 to 38.9 in adult     I spent <15 minutes face to face with individual providing recommendations for nutrition choices and exercise plan to help achieve weight reduction goals. Obesity is systemic disorder and it can bring devastating morbidities in furture. It is a matter of calorie gain and loss, keeping bodybank in negative calorie balance mode is the way to sustain weight loss.Diet has a big role in reducing excess body wt. Scheduled and well planned meals and food intake with watchfulness and understanding of calorie portion and distribution is key to understand. Bariatric surgery is another option if sustained wt loss is not achieved and faced with one or more comorbidities with morbid obesity. Weigh yourself twice a week to understand and follow wt loss goals.           Relevant Orders    CBC and Auto Differential    Comprehensive Metabolic Panel    Lipid  Panel    TSH with reflex to Free T4 if abnormal    RESOLVED: Hyperlipidemia    Relevant Orders    CBC and Auto Differential    Comprehensive Metabolic Panel    Lipid Panel    TSH with reflex to Free T4 if abnormal    Attention deficit hyperactivity disorder (ADHD), predominantly inattentive type - Primary     I personally reviewed the OARRS report for this patient. This report is scanned into the electronic medical record. I have considered  the risks of abuse, dependence, addiction and diversion. After discussion, patient does have a good understanding of the safety and  risks of this medication. I believe that it is clinically appropriate for this patient to be prescribed this medication.  See patient back in 6 weeks.         Relevant Medications    amphetamine-dextroamphetamine XR (Adderall XR) 10 mg 24 hr capsule    Other Relevant Orders    CBC and Auto Differential    Comprehensive Metabolic Panel    Lipid Panel    TSH with reflex to Free T4 if abnormal   Patient was evaluated today, problem list was reviewed, problems and concerns addressed, Rx list reviewed and updated, lab and tests were noted and reviewed. Life style changes were discussed, always it works better if we eat plant based diet and plenty of fibres and roughage. Consume adequate amount of water and avoid alcohol, light to moderate physical activities and stress reduction are always beneficial for ongoing physical well being. Do not forget to have 6 to 7 hours of sleep regularly and avoid late night fredo screen exposure.      HPI this is a 48-year-old patient who had a history of ADD ADHD metabolic syndrome hypertension hyperlipidemia insulin resistance syndrome advised continue Adderall Lipitor Glucophage Benicar aspirin B12 folic acid strong family history of stroke both mother and father advised to take folic acid garlic regular exercise with a 2D echo carotid duplex and follow-up    Negative for headache or chest pain negative for fall  negative for suicide negative for chemical abuse or dependency obesity  Past Medical History:   Diagnosis Date    Chronic fatigue 04/06/2023    Closed fracture of transverse process of lumbar vertebra (CMS/HCC) 11/03/2019    Fracture of humeral shaft, right, closed 11/02/2019    Personal history of other endocrine, nutritional and metabolic disease 10/25/2021    History of obesity    Plantar fasciitis 10/19/2017    Sprain of anterior talofibular ligament of right ankle 10/19/2017    Tendonitis 10/19/2017    Tinea pedis of right foot 03/26/2018    Unspecified asthma, uncomplicated 10/25/2021    Acute asthmatic bronchitis     Past Surgical History:   Procedure Laterality Date    OTHER SURGICAL HISTORY  10/25/2021    Humerus fracture repair    OTHER SURGICAL HISTORY  10/25/2021    Inguinal hernia repair     No Known Allergies  Current Outpatient Medications   Medication Sig Dispense Refill    atorvastatin (Lipitor) 10 mg tablet Take 1 tablet (10 mg) by mouth once daily at bedtime. 90 tablet 3    metFORMIN (Glucophage) 500 mg tablet Take 1 tablet (500 mg) by mouth once daily with a meal. 30 tablet 11    olmesartan-hydrochlorothiazide (Benicar HCT) 40-25 mg tablet Take 1 tablet by mouth once daily. 90 tablet 1    amphetamine-dextroamphetamine XR (Adderall XR) 10 mg 24 hr capsule Take 1 capsule (10 mg) by mouth once daily in the morning. Do not crush or chew. 30 capsule 0     No current facility-administered medications for this visit.     Family History   Problem Relation Name Age of Onset    Stroke Mother      Other (vascular disorder) Father       Social History     Socioeconomic History    Marital status: Unknown     Spouse name: None    Number of children: None    Years of education: None    Highest education level: None   Occupational History    None   Tobacco Use    Smoking status: Former     Types: Cigarettes    Smokeless tobacco: Never   Substance and Sexual Activity    Alcohol use: Yes     Comment: social     "Drug use: Never    Sexual activity: None   Other Topics Concern    None   Social History Narrative    None     Social Determinants of Health     Financial Resource Strain: Not on file   Food Insecurity: Not on file   Transportation Needs: Not on file   Physical Activity: Not on file   Stress: Not on file   Social Connections: Not on file   Intimate Partner Violence: Not on file   Housing Stability: Not on file     Immunization History   Administered Date(s) Administered    Influenza, Unspecified 09/29/2010       Review of Systems  Review of systems is otherwise negative unless stated above or in history of present illness.    Objective   Visit Vitals  /74 (BP Location: Right arm, Patient Position: Sitting, BP Cuff Size: Large adult)   Pulse 87   Temp 36.1 °C (97 °F)   Ht 1.778 m (5' 10\")   Wt 108 kg (239 lb)   SpO2 97%   BMI 34.29 kg/m²   Smoking Status Former   BSA 2.31 m²     Physical Exam  Constitutional: Obesity     General: not in acute distress.   HENT:      Head: Normocephalic and atraumatic.      Nose: Nose normal.   Eyes:      Extraocular Movements: Extraocular movements intact.      Conjunctiva/sclera: Conjunctivae normal.   Cardiovascular: Heart murmur     Rate and Rhythm: Normal rate ,  No M/R/G  Pulmonary:      Effort: Pulmonary effort is normal.      Breath sounds: Normal, Bilat Equal AE  Skin:     General: Skin is warm.   Neurological: Neuralgia     Mental Status: He is alert and oriented to person, place, and time.   Psychiatric:    Anxiety     Mood and Affect: Mood normal.         Behavior: Behavior normal.   Musculoskeletal   FROM in all extremitirs,  Joint-no swelling or tenderness    Lab on 08/14/2023   Component Date Value Ref Range Status    ALBUMIN (MG/L) IN URINE 08/14/2023 16.4  Not Established mg/L Final    Albumin/Creatine Ratio 08/14/2023 22.0  0.0 - 30.0 ug/mg crt Final    Creatinine, Urine 08/14/2023 74.7  20.0 - 370.0 mg/dL Final    DRUG SCREEN COMMENT URINE 08/14/2023 SEE BELOW  "  Final    Amphetamine Screen, Urine 08/14/2023 PRESUMPTIVE NEGATIVE  NEGATIVE Final    Barbiturate Screen, Urine 08/14/2023 PRESUMPTIVE NEGATIVE  NEGATIVE Final    BENZODIAZEPINE (PRESENCE) IN URINE* 08/14/2023 PRESUMPTIVE NEGATIVE  NEGATIVE Final    Cannabinoid Screen, Urine 08/14/2023 PRESUMPTIVE NEGATIVE  NEGATIVE Final    Cocaine Screen, Urine 08/14/2023 PRESUMPTIVE NEGATIVE  NEGATIVE Final    Fentanyl, Ur 08/14/2023 PRESUMPTIVE NEGATIVE  NEGATIVE Final    Methadone Screen, Urine 08/14/2023 PRESUMPTIVE NEGATIVE  NEGATIVE Final    Opiate Screen, Urine 08/14/2023 PRESUMPTIVE NEGATIVE  NEGATIVE Final    Oxycodone Screen, Ur 08/14/2023 PRESUMPTIVE NEGATIVE  NEGATIVE Final    PCP Screen, Urine 08/14/2023 PRESUMPTIVE NEGATIVE  NEGATIVE Final       Radiology: Reviewed imaging in powerchart.  CT cardiac scoring wo IV contrast    Result Date: 9/25/2023  Interpreted By:  WADE OLIVEIRA MD MRN: [id number] Patient Name: SHARON DOYLE  STUDY: CT CARDIAC SCORING;  9/24/2023 9:33 am  INDICATION: Hypertension, unspecified type.  COMPARISON: None.  ACCESSION NUMBER(S): [id number]  ORDERING CLINICIAN: GEO MARTINEZ  TECHNIQUE: Using prospective ECG gating, CT scan of the coronary arteries was performed without intravenous contrast. Coronary calcium scoring  was performed according to the method of Agatston.  FINDINGS: The score and distribution of calcium in the coronary arteries is as follows:  LM 48.62, LAD 48, LCx 17.23, RCA 32.77,  Total   146.62  The visualized mid/lower ascending thoracic aorta measures 3.1 cm in diameter. The heart is normal in size. No pericardial effusion is present.  No gross evidence of mediastinal or hilar lymphadenopathy or masses is identified. The visualized segments of the lungs are normally expanded. 4 mm nodular density along the right minor fissure, image 33/62, likely a lymph node. 3 mm left lower lobe pleural-based nodule, image 36/62.  The visualized subdiaphragmatic structures  appear intact.      1. Coronary artery calcium score of 146.62*. 2. Couple pulmonary nodules measuring up to 4 mm as described above. Incidental Finding:  A non-calcified pulmonary nodule/ multiple non-calcified pulmonary nodules measuring less than 6 mm, likely benign.  (**-YCF-**)  Instructions:  No further follow-up is required, however, if the patient has high risk factors for primary lung malignancy, follow-up noncontrast CT scan chest in 12 months may be obtained. (Meir Ruggiero et al., Guidelines for management of incidental pulmonary nodules detected on CT images: From the Fleischner Society 2017, Radiology. 2017 Jul;284 (1):228-243.) [email or web address]   *Coronary Artery Calcium Gated and Nongated Agatston score Score                                      Risk 0                                       Very low 1-99                                  Mildly increased 100-299                             Moderately increased >300                                Moderate to severely increased  Tracey et al. JCCT 2016 (http://dx.[email or web address]/[email or web address])  SOLORZANO 10-Year CHD Risk with Coronary Artery Calcification can be calcuate using link below  [email or web address]  Gely et al. JACC 2015 ([email or web address]/[email or web address] [email or web address])        Charting was completed using voice recognition technology and may include unintended errors.

## 2023-10-04 LAB
BASOPHILS # BLD AUTO: 0.05 X10*3/UL (ref 0–0.1)
BASOPHILS NFR BLD AUTO: 1 %
EOSINOPHIL # BLD AUTO: 0.1 X10*3/UL (ref 0–0.7)
EOSINOPHIL NFR BLD AUTO: 2 %
ERYTHROCYTE [DISTWIDTH] IN BLOOD BY AUTOMATED COUNT: 12.9 % (ref 11.5–14.5)
HCT VFR BLD AUTO: 48.1 % (ref 41–52)
HGB BLD-MCNC: 16.4 G/DL (ref 13.5–17.5)
IMM GRANULOCYTES # BLD AUTO: 0.01 X10*3/UL (ref 0–0.7)
IMM GRANULOCYTES NFR BLD AUTO: 0.2 % (ref 0–0.9)
LYMPHOCYTES # BLD AUTO: 1.52 X10*3/UL (ref 1.2–4.8)
LYMPHOCYTES NFR BLD AUTO: 30.1 %
MCH RBC QN AUTO: 32.7 PG (ref 26–34)
MCHC RBC AUTO-ENTMCNC: 34.1 G/DL (ref 32–36)
MCV RBC AUTO: 96 FL (ref 80–100)
MONOCYTES # BLD AUTO: 0.48 X10*3/UL (ref 0.1–1)
MONOCYTES NFR BLD AUTO: 9.5 %
NEUTROPHILS # BLD AUTO: 2.89 X10*3/UL (ref 1.2–7.7)
NEUTROPHILS NFR BLD AUTO: 57.2 %
NRBC BLD-RTO: 0 /100 WBCS (ref 0–0)
PLATELET # BLD AUTO: 281 X10*3/UL (ref 150–450)
PMV BLD AUTO: 10.1 FL (ref 7.5–11.5)
RBC # BLD AUTO: 5.01 X10*6/UL (ref 4.5–5.9)
WBC # BLD AUTO: 5.1 X10*3/UL (ref 4.4–11.3)

## 2023-10-05 ENCOUNTER — TELEPHONE (OUTPATIENT)
Dept: PRIMARY CARE | Facility: CLINIC | Age: 48
End: 2023-10-05
Payer: COMMERCIAL

## 2023-10-05 NOTE — TELEPHONE ENCOUNTER
Spoke to pt regarding lab results- informed him LDL was 68 Bun was 24 Albumin was 5.1, recommendations were to increase water intake and follow up 4-6 months. Pt expressed understanding.

## 2023-12-12 ENCOUNTER — OFFICE VISIT (OUTPATIENT)
Dept: PRIMARY CARE | Facility: CLINIC | Age: 48
End: 2023-12-12
Payer: COMMERCIAL

## 2023-12-12 VITALS
DIASTOLIC BLOOD PRESSURE: 84 MMHG | WEIGHT: 248 LBS | HEART RATE: 78 BPM | BODY MASS INDEX: 35.5 KG/M2 | SYSTOLIC BLOOD PRESSURE: 128 MMHG | TEMPERATURE: 97.4 F | OXYGEN SATURATION: 97 % | HEIGHT: 70 IN

## 2023-12-12 DIAGNOSIS — E66.09 CLASS 2 OBESITY DUE TO EXCESS CALORIES WITHOUT SERIOUS COMORBIDITY WITH BODY MASS INDEX (BMI) OF 38.0 TO 38.9 IN ADULT: Primary | ICD-10-CM

## 2023-12-12 DIAGNOSIS — R73.9 HYPERGLYCEMIA: ICD-10-CM

## 2023-12-12 DIAGNOSIS — I10 BENIGN ESSENTIAL HYPERTENSION: ICD-10-CM

## 2023-12-12 DIAGNOSIS — F90.0 ATTENTION DEFICIT HYPERACTIVITY DISORDER (ADHD), PREDOMINANTLY INATTENTIVE TYPE: ICD-10-CM

## 2023-12-12 DIAGNOSIS — E78.2 MIXED HYPERLIPIDEMIA: ICD-10-CM

## 2023-12-12 PROCEDURE — 1036F TOBACCO NON-USER: CPT | Performed by: INTERNAL MEDICINE

## 2023-12-12 PROCEDURE — 3079F DIAST BP 80-89 MM HG: CPT | Performed by: INTERNAL MEDICINE

## 2023-12-12 PROCEDURE — 99214 OFFICE O/P EST MOD 30 MIN: CPT | Performed by: INTERNAL MEDICINE

## 2023-12-12 PROCEDURE — 3074F SYST BP LT 130 MM HG: CPT | Performed by: INTERNAL MEDICINE

## 2023-12-12 PROCEDURE — 3008F BODY MASS INDEX DOCD: CPT | Performed by: INTERNAL MEDICINE

## 2023-12-12 RX ORDER — OLMESARTAN MEDOXOMIL AND HYDROCHLOROTHIAZIDE 40/25 40; 25 MG/1; MG/1
1 TABLET ORAL DAILY
Qty: 90 TABLET | Refills: 3 | Status: SHIPPED | OUTPATIENT
Start: 2023-12-12

## 2023-12-12 RX ORDER — METFORMIN HYDROCHLORIDE 500 MG/1
500 TABLET ORAL
Qty: 90 TABLET | Refills: 3 | Status: SHIPPED | OUTPATIENT
Start: 2023-12-12 | End: 2024-12-11

## 2023-12-12 RX ORDER — NAPROXEN SODIUM 220 MG/1
81 TABLET, FILM COATED ORAL DAILY
Qty: 90 TABLET | Refills: 3 | Status: SHIPPED | OUTPATIENT
Start: 2023-12-12 | End: 2024-12-11

## 2023-12-12 RX ORDER — ATORVASTATIN CALCIUM 10 MG/1
10 TABLET, FILM COATED ORAL NIGHTLY
Qty: 90 TABLET | Refills: 3 | Status: SHIPPED | OUTPATIENT
Start: 2023-12-12 | End: 2024-04-11 | Stop reason: SDUPTHER

## 2023-12-12 RX ORDER — DEXTROAMPHETAMINE SACCHARATE, AMPHETAMINE ASPARTATE MONOHYDRATE, DEXTROAMPHETAMINE SULFATE AND AMPHETAMINE SULFATE 2.5; 2.5; 2.5; 2.5 MG/1; MG/1; MG/1; MG/1
10 CAPSULE, EXTENDED RELEASE ORAL EVERY MORNING
Qty: 30 CAPSULE | Refills: 0 | Status: SHIPPED | OUTPATIENT
Start: 2023-12-12 | End: 2024-04-11 | Stop reason: SDUPTHER

## 2023-12-12 RX ORDER — SEMAGLUTIDE 0.25 MG/.5ML
0.25 INJECTION, SOLUTION SUBCUTANEOUS
Qty: 3 ML | Refills: 0 | Status: SHIPPED | OUTPATIENT
Start: 2023-12-12 | End: 2024-04-11 | Stop reason: ALTCHOICE

## 2023-12-12 NOTE — PROGRESS NOTES
Subjective   Patient ID: Calderon Hernandez is a 48 y.o. male who presents for Follow-up (Discuss Wegovy ).    Assessment/Plan   48-year-old patient evaluated for discuss about weight    Obesity BMI 35 had a sleep apnea uses CPAP not successful    Diet exercise plus Wegovy no cancer personal or family  Hyperlipidemia Lipitor 10 mg a day  Watch LFT CPK lipid once a year  ADD ADHD continue Adderall 10 mg a day  Urine tox screen  I personally reviewed the [facility] report for this patient. This report is scanned into the electronic medical record. I have considered  the risks of abuse, dependence, addiction and diversion. After discussion, patient does have a good understanding of the safety and  risks of this medication. I believe that it is clinically appropriate for this patient to be prescribed this medication.  See patient back in 6 weeks.  Metabolic syndrome insulin resistance syndrome  Glucophage 500 mg with each pill  Watch for lactic acidosis hypoglycemia  Hypotension  Benicar HCTZ 40/25 a day  BMP uric acid magnesium twice a year  Discussed long and short-term side effect of all medication  Follow-up 3 months  Problem List Items Addressed This Visit       Benign essential hypertension    Class 2 obesity with body mass index (BMI) of 38.0 to 38.9 in adult - Primary    Mixed hyperlipidemia    Attention deficit hyperactivity disorder (ADHD), predominantly inattentive type    Hyperglycemia       HPI  48-year-old patient have metabolic syndrome insulin resistance syndrome obesity sleep apnea hypertension hyperlipidemia gastritis hyperglycemia obesity ADD ADHD    Diagnosis of the sleep apnea using CPAP not successful    Continue gaining weight weight induced arthralgia myalgia fatigue tired snoring    Negative for headache chest pain hematuria rectal bleeding    Negative for COVID or fall    Negative for suicide  Past Medical History:   Diagnosis Date    Chronic fatigue 04/06/2023    Closed fracture of transverse  process of lumbar vertebra (CMS/HCC) 11/03/2019    Fracture of humeral shaft, right, closed 11/02/2019    Personal history of other endocrine, nutritional and metabolic disease 10/25/2021    History of obesity    Plantar fasciitis 10/19/2017    Sprain of anterior talofibular ligament of right ankle 10/19/2017    Tendonitis 10/19/2017    Tinea pedis of right foot 03/26/2018    Unspecified asthma, uncomplicated 10/25/2021    Acute asthmatic bronchitis     Past Surgical History:   Procedure Laterality Date    OTHER SURGICAL HISTORY  10/25/2021    Humerus fracture repair    OTHER SURGICAL HISTORY  10/25/2021    Inguinal hernia repair     No Known Allergies  Current Outpatient Medications   Medication Sig Dispense Refill    amphetamine-dextroamphetamine XR (Adderall XR) 10 mg 24 hr capsule Take 1 capsule (10 mg) by mouth once daily in the morning. Do not crush or chew. 30 capsule 0    atorvastatin (Lipitor) 10 mg tablet Take 1 tablet (10 mg) by mouth once daily at bedtime. 90 tablet 3    metFORMIN (Glucophage) 500 mg tablet Take 1 tablet (500 mg) by mouth once daily with a meal. 30 tablet 11    olmesartan-hydrochlorothiazide (Benicar HCT) 40-25 mg tablet Take 1 tablet by mouth once daily. 90 tablet 1     No current facility-administered medications for this visit.     Family History   Problem Relation Name Age of Onset    Stroke Mother      Other (vascular disorder) Father       Social History     Socioeconomic History    Marital status: Unknown     Spouse name: None    Number of children: None    Years of education: None    Highest education level: None   Occupational History    None   Tobacco Use    Smoking status: Former     Types: Cigarettes    Smokeless tobacco: Never   Substance and Sexual Activity    Alcohol use: Yes     Comment: social    Drug use: Never    Sexual activity: None   Other Topics Concern    None   Social History Narrative    None     Social Determinants of Health     Financial Resource Strain: Not  "on file   Food Insecurity: Not on file   Transportation Needs: Not on file   Physical Activity: Not on file   Stress: Not on file   Social Connections: Not on file   Intimate Partner Violence: Not on file   Housing Stability: Not on file     Immunization History   Administered Date(s) Administered    Influenza, Unspecified 09/29/2010       Review of Systems  Review of systems is otherwise negative unless stated above or in history of present illness.    Objective   Visit Vitals  /84 (BP Location: Right arm, Patient Position: Sitting, BP Cuff Size: Large adult)   Pulse 78   Temp 36.3 °C (97.4 °F)   Ht 1.778 m (5' 10\")   Wt 112 kg (248 lb)   SpO2 97%   BMI 35.58 kg/m²   Smoking Status Former   BSA 2.35 m²     Physical Exam  Constitutional: BMI 35     General: not in acute distress.   HENT:      Head: Normocephalic and atraumatic.      Nose: Nose normal.   Eyes:      Extraocular Movements: Extraocular movements intact.      Conjunctiva/sclera: Conjunctivae normal.   Cardiovascular: Systolic heart murmur     Rate and Rhythm: Normal rate ,  No M/R/G  Pulmonary:      Effort: Pulmonary effort is normal.      Breath sounds: Normal, Bilat Equal AE  Skin:     General: Skin is warm.   Neurological:      Mental Status: He is alert and oriented to person, place, and time.   Psychiatric:         Mood and Affect: Mood normal.         Behavior: Behavior normal.   Musculoskeletal   FROM in all extremitirs,  Joint-no swelling or tenderness    Lab on 10/03/2023   Component Date Value Ref Range Status    WBC 10/03/2023 5.1  4.4 - 11.3 x10*3/uL Final    nRBC 10/03/2023 0.0  0.0 - 0.0 /100 WBCs Final    RBC 10/03/2023 5.01  4.50 - 5.90 x10*6/uL Final    Hemoglobin 10/03/2023 16.4  13.5 - 17.5 g/dL Final    Hematocrit 10/03/2023 48.1  41.0 - 52.0 % Final    MCV 10/03/2023 96  80 - 100 fL Final    MCH 10/03/2023 32.7  26.0 - 34.0 pg Final    MCHC 10/03/2023 34.1  32.0 - 36.0 g/dL Final    RDW 10/03/2023 12.9  11.5 - 14.5 % Final    " Platelets 10/03/2023 281  150 - 450 x10*3/uL Final    MPV 10/03/2023 10.1  7.5 - 11.5 fL Final    Neutrophils % 10/03/2023 57.2  40.0 - 80.0 % Final    Immature Granulocytes %, Automated 10/03/2023 0.2  0.0 - 0.9 % Final    Lymphocytes % 10/03/2023 30.1  13.0 - 44.0 % Final    Monocytes % 10/03/2023 9.5  2.0 - 10.0 % Final    Eosinophils % 10/03/2023 2.0  0.0 - 6.0 % Final    Basophils % 10/03/2023 1.0  0.0 - 2.0 % Final    Neutrophils Absolute 10/03/2023 2.89  1.20 - 7.70 x10*3/uL Final    Immature Granulocytes Absolute, Au* 10/03/2023 0.01  0.00 - 0.70 x10*3/uL Final    Lymphocytes Absolute 10/03/2023 1.52  1.20 - 4.80 x10*3/uL Final    Monocytes Absolute 10/03/2023 0.48  0.10 - 1.00 x10*3/uL Final    Eosinophils Absolute 10/03/2023 0.10  0.00 - 0.70 x10*3/uL Final    Basophils Absolute 10/03/2023 0.05  0.00 - 0.10 x10*3/uL Final    Glucose 10/03/2023 96  74 - 99 mg/dL Final    Sodium 10/03/2023 138  136 - 145 mmol/L Final    Potassium 10/03/2023 4.3  3.5 - 5.3 mmol/L Final    Chloride 10/03/2023 99  98 - 107 mmol/L Final    Bicarbonate 10/03/2023 27  21 - 32 mmol/L Final    Anion Gap 10/03/2023 16  10 - 20 mmol/L Final    Urea Nitrogen 10/03/2023 24 (H)  6 - 23 mg/dL Final    Creatinine 10/03/2023 0.92  0.50 - 1.30 mg/dL Final    eGFR 10/03/2023 >90  >60 mL/min/1.73m*2 Final    Calcium 10/03/2023 10.3  8.6 - 10.6 mg/dL Final    Albumin 10/03/2023 5.1 (H)  3.4 - 5.0 g/dL Final    Alkaline Phosphatase 10/03/2023 46  33 - 120 U/L Final    Total Protein 10/03/2023 7.6  6.4 - 8.2 g/dL Final    AST 10/03/2023 15  9 - 39 U/L Final    Bilirubin, Total 10/03/2023 0.7  0.0 - 1.2 mg/dL Final    ALT 10/03/2023 22  10 - 52 U/L Final    Cholesterol 10/03/2023 139  0 - 199 mg/dL Final    HDL-Cholesterol 10/03/2023 54.1  mg/dL Final    Cholesterol/HDL Ratio 10/03/2023 2.6   Final    LDL Calculated 10/03/2023 68 (L)  140 - 190 mg/dL Final    VLDL 10/03/2023 17  0 - 40 mg/dL Final    Triglycerides 10/03/2023 86  0 - 149 mg/dL  Final    Non HDL Cholesterol 10/03/2023 85  0 - 149 mg/dL Final    Thyroid Stimulating Hormone 10/03/2023 0.93  0.44 - 3.98 mIU/L Final   Lab on 08/14/2023   Component Date Value Ref Range Status    ALBUMIN (MG/L) IN URINE 08/14/2023 16.4  Not Established mg/L Final    Albumin/Creatine Ratio 08/14/2023 22.0  0.0 - 30.0 ug/mg crt Final    Creatinine, Urine 08/14/2023 74.7  20.0 - 370.0 mg/dL Final    DRUG SCREEN COMMENT URINE 08/14/2023 SEE BELOW   Final    Amphetamine Screen, Urine 08/14/2023 PRESUMPTIVE NEGATIVE  NEGATIVE Final    Barbiturate Screen, Urine 08/14/2023 PRESUMPTIVE NEGATIVE  NEGATIVE Final    BENZODIAZEPINE (PRESENCE) IN URINE* 08/14/2023 PRESUMPTIVE NEGATIVE  NEGATIVE Final    Cannabinoid Screen, Urine 08/14/2023 PRESUMPTIVE NEGATIVE  NEGATIVE Final    Cocaine Screen, Urine 08/14/2023 PRESUMPTIVE NEGATIVE  NEGATIVE Final    Fentanyl, Ur 08/14/2023 PRESUMPTIVE NEGATIVE  NEGATIVE Final    Methadone Screen, Urine 08/14/2023 PRESUMPTIVE NEGATIVE  NEGATIVE Final    Opiate Screen, Urine 08/14/2023 PRESUMPTIVE NEGATIVE  NEGATIVE Final    Oxycodone Screen, Ur 08/14/2023 PRESUMPTIVE NEGATIVE  NEGATIVE Final    PCP Screen, Urine 08/14/2023 PRESUMPTIVE NEGATIVE  NEGATIVE Final       Radiology: Reviewed imaging in powerchart.  No results found.      Charting was completed using voice recognition technology and may include unintended errors.

## 2024-02-23 ENCOUNTER — OFFICE VISIT (OUTPATIENT)
Dept: PRIMARY CARE | Facility: CLINIC | Age: 49
End: 2024-02-23
Payer: COMMERCIAL

## 2024-02-23 VITALS
WEIGHT: 256 LBS | OXYGEN SATURATION: 97 % | DIASTOLIC BLOOD PRESSURE: 76 MMHG | BODY MASS INDEX: 36.65 KG/M2 | HEIGHT: 70 IN | HEART RATE: 98 BPM | SYSTOLIC BLOOD PRESSURE: 118 MMHG

## 2024-02-23 DIAGNOSIS — S39.012A STRAIN OF LUMBAR REGION, INITIAL ENCOUNTER: Primary | ICD-10-CM

## 2024-02-23 DIAGNOSIS — E66.09 CLASS 2 OBESITY DUE TO EXCESS CALORIES WITHOUT SERIOUS COMORBIDITY WITH BODY MASS INDEX (BMI) OF 38.0 TO 38.9 IN ADULT: ICD-10-CM

## 2024-02-23 PROCEDURE — 3008F BODY MASS INDEX DOCD: CPT | Performed by: STUDENT IN AN ORGANIZED HEALTH CARE EDUCATION/TRAINING PROGRAM

## 2024-02-23 PROCEDURE — 96372 THER/PROPH/DIAG INJ SC/IM: CPT | Performed by: STUDENT IN AN ORGANIZED HEALTH CARE EDUCATION/TRAINING PROGRAM

## 2024-02-23 PROCEDURE — 1036F TOBACCO NON-USER: CPT | Performed by: STUDENT IN AN ORGANIZED HEALTH CARE EDUCATION/TRAINING PROGRAM

## 2024-02-23 PROCEDURE — 99213 OFFICE O/P EST LOW 20 MIN: CPT | Performed by: STUDENT IN AN ORGANIZED HEALTH CARE EDUCATION/TRAINING PROGRAM

## 2024-02-23 PROCEDURE — 3078F DIAST BP <80 MM HG: CPT | Performed by: STUDENT IN AN ORGANIZED HEALTH CARE EDUCATION/TRAINING PROGRAM

## 2024-02-23 PROCEDURE — 3074F SYST BP LT 130 MM HG: CPT | Performed by: STUDENT IN AN ORGANIZED HEALTH CARE EDUCATION/TRAINING PROGRAM

## 2024-02-23 RX ORDER — KETOROLAC TROMETHAMINE 30 MG/ML
30 INJECTION, SOLUTION INTRAMUSCULAR; INTRAVENOUS ONCE
Status: COMPLETED | OUTPATIENT
Start: 2024-02-23 | End: 2024-02-23

## 2024-02-23 RX ORDER — METHOCARBAMOL 500 MG/1
500 TABLET, FILM COATED ORAL 3 TIMES DAILY PRN
Qty: 40 TABLET | Refills: 0 | Status: SHIPPED | OUTPATIENT
Start: 2024-02-23 | End: 2024-04-11 | Stop reason: ALTCHOICE

## 2024-02-23 RX ORDER — METHYLPREDNISOLONE 4 MG/1
TABLET ORAL
Qty: 21 TABLET | Refills: 0 | Status: SHIPPED | OUTPATIENT
Start: 2024-02-23 | End: 2024-03-01

## 2024-02-23 RX ADMIN — KETOROLAC TROMETHAMINE 30 MG: 30 INJECTION, SOLUTION INTRAMUSCULAR; INTRAVENOUS at 15:42

## 2024-02-23 NOTE — PROGRESS NOTES
"Subjective   Patient ID: Calderon Hernandez is a 48 y.o. male who presents for the following    Assessment/Plan   #Lumbar Back Strain   #Hx of Lumbar Fx without surgical need  #Possible Piriformis Syndrome     PLAN  -Toradol 30mg IM once  -Robaxin 500mg PO TID PRN   -Medrol dose pack   -Does not want PT at this time     HPI  48M presents for acute sick visit. Has hx of L1-L4 fx. Has had frequent flare up. For 2 weeks has had moderate to severe back pain in the R lumbar region. No new numbness/weakness/tingling in the legs, groin. No urinary or bowel incontinence. No trauma, falls reported. 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 NCAT. Aox3   HEENT: PERRLA. EOMI. MMM. Nares patent bl.  Cardiovascular: RRR.   Respiratory: No acute respiratory distress.   GI: Soft, NT abdomen.  MSK: ROM x 4. Lumbar paraspinal tenderness. Negative SLR. Has R buttock pain/tenderness.   Extremities: No edema. Cap refill < 2 sec.   Skin: No rashes or bruises.   Neuro: Aox3. Cranial Nerves grossly intact. Motor/sensory wnl.   Psych: Mood wnl.      REVIEW OF SYSTEMS   ROS in HPI     No Known Allergies    Current Outpatient Medications   Medication Sig Dispense Refill    aspirin 81 mg chewable tablet Chew 1 tablet (81 mg) once daily. 90 tablet 3    atorvastatin (Lipitor) 10 mg tablet Take 1 tablet (10 mg) by mouth once daily at bedtime. 90 tablet 3    metFORMIN (Glucophage) 500 mg tablet Take 1 tablet (500 mg) by mouth once daily with a meal. 90 tablet 3    olmesartan-hydrochlorothiazide (Benicar HCT) " 40-25 mg tablet Take 1 tablet by mouth once daily. 90 tablet 3    semaglutide, weight loss, (Wegovy) 0.25 mg/0.5 mL pen injector Inject 0.25 mg under the skin every 7 days. 3 mL 0    amphetamine-dextroamphetamine XR (Adderall XR) 10 mg 24 hr capsule Take 1 capsule (10 mg) by mouth once daily in the morning. Do not crush or chew. 30 capsule 0     No current facility-administered medications for this visit.       Objective     No visits with results within 4 Month(s) from this visit.   Latest known visit with results is:   Lab on 10/03/2023   Component Date Value Ref Range Status    WBC 10/03/2023 5.1  4.4 - 11.3 x10*3/uL Final    nRBC 10/03/2023 0.0  0.0 - 0.0 /100 WBCs Final    RBC 10/03/2023 5.01  4.50 - 5.90 x10*6/uL Final    Hemoglobin 10/03/2023 16.4  13.5 - 17.5 g/dL Final    Hematocrit 10/03/2023 48.1  41.0 - 52.0 % Final    MCV 10/03/2023 96  80 - 100 fL Final    MCH 10/03/2023 32.7  26.0 - 34.0 pg Final    MCHC 10/03/2023 34.1  32.0 - 36.0 g/dL Final    RDW 10/03/2023 12.9  11.5 - 14.5 % Final    Platelets 10/03/2023 281  150 - 450 x10*3/uL Final    MPV 10/03/2023 10.1  7.5 - 11.5 fL Final    Neutrophils % 10/03/2023 57.2  40.0 - 80.0 % Final    Immature Granulocytes %, Automated 10/03/2023 0.2  0.0 - 0.9 % Final    Lymphocytes % 10/03/2023 30.1  13.0 - 44.0 % Final    Monocytes % 10/03/2023 9.5  2.0 - 10.0 % Final    Eosinophils % 10/03/2023 2.0  0.0 - 6.0 % Final    Basophils % 10/03/2023 1.0  0.0 - 2.0 % Final    Neutrophils Absolute 10/03/2023 2.89  1.20 - 7.70 x10*3/uL Final    Immature Granulocytes Absolute, Au* 10/03/2023 0.01  0.00 - 0.70 x10*3/uL Final    Lymphocytes Absolute 10/03/2023 1.52  1.20 - 4.80 x10*3/uL Final    Monocytes Absolute 10/03/2023 0.48  0.10 - 1.00 x10*3/uL Final    Eosinophils Absolute 10/03/2023 0.10  0.00 - 0.70 x10*3/uL Final    Basophils Absolute 10/03/2023 0.05  0.00 - 0.10 x10*3/uL Final    Glucose 10/03/2023 96  74 - 99 mg/dL Final    Sodium 10/03/2023 138  136 - 145  mmol/L Final    Potassium 10/03/2023 4.3  3.5 - 5.3 mmol/L Final    Chloride 10/03/2023 99  98 - 107 mmol/L Final    Bicarbonate 10/03/2023 27  21 - 32 mmol/L Final    Anion Gap 10/03/2023 16  10 - 20 mmol/L Final    Urea Nitrogen 10/03/2023 24 (H)  6 - 23 mg/dL Final    Creatinine 10/03/2023 0.92  0.50 - 1.30 mg/dL Final    eGFR 10/03/2023 >90  >60 mL/min/1.73m*2 Final    Calcium 10/03/2023 10.3  8.6 - 10.6 mg/dL Final    Albumin 10/03/2023 5.1 (H)  3.4 - 5.0 g/dL Final    Alkaline Phosphatase 10/03/2023 46  33 - 120 U/L Final    Total Protein 10/03/2023 7.6  6.4 - 8.2 g/dL Final    AST 10/03/2023 15  9 - 39 U/L Final    Bilirubin, Total 10/03/2023 0.7  0.0 - 1.2 mg/dL Final    ALT 10/03/2023 22  10 - 52 U/L Final    Cholesterol 10/03/2023 139  0 - 199 mg/dL Final    HDL-Cholesterol 10/03/2023 54.1  mg/dL Final    Cholesterol/HDL Ratio 10/03/2023 2.6   Final    LDL Calculated 10/03/2023 68 (L)  140 - 190 mg/dL Final    VLDL 10/03/2023 17  0 - 40 mg/dL Final    Triglycerides 10/03/2023 86  0 - 149 mg/dL Final    Non HDL Cholesterol 10/03/2023 85  0 - 149 mg/dL Final    Thyroid Stimulating Hormone 10/03/2023 0.93  0.44 - 3.98 mIU/L Final       Radiology: Reviewed imaging in powerchart.  No results found.    Family History   Problem Relation Name Age of Onset    Stroke Mother      Other (vascular disorder) Father       Social History     Socioeconomic History    Marital status: Unknown     Spouse name: None    Number of children: None    Years of education: None    Highest education level: None   Occupational History    None   Tobacco Use    Smoking status: Former     Types: Cigarettes    Smokeless tobacco: Never   Substance and Sexual Activity    Alcohol use: Yes     Comment: social    Drug use: Never    Sexual activity: None   Other Topics Concern    None   Social History Narrative    None     Social Determinants of Health     Financial Resource Strain: Not on file   Food Insecurity: Not on file   Transportation  Needs: Not on file   Physical Activity: Not on file   Stress: Not on file   Social Connections: Not on file   Intimate Partner Violence: Not on file   Housing Stability: Not on file     Past Medical History:   Diagnosis Date    Chronic fatigue 04/06/2023    Closed fracture of transverse process of lumbar vertebra (CMS/HCC) 11/03/2019    Fracture of humeral shaft, right, closed 11/02/2019    Personal history of other endocrine, nutritional and metabolic disease 10/25/2021    History of obesity    Plantar fasciitis 10/19/2017    Sprain of anterior talofibular ligament of right ankle 10/19/2017    Tendonitis 10/19/2017    Tinea pedis of right foot 03/26/2018    Unspecified asthma, uncomplicated 10/25/2021    Acute asthmatic bronchitis     Past Surgical History:   Procedure Laterality Date    OTHER SURGICAL HISTORY  10/25/2021    Humerus fracture repair    OTHER SURGICAL HISTORY  10/25/2021    Inguinal hernia repair       Charting was completed using voice recognition technology and may include unintended errors.

## 2024-03-21 DIAGNOSIS — E66.09 CLASS 2 OBESITY DUE TO EXCESS CALORIES WITHOUT SERIOUS COMORBIDITY WITH BODY MASS INDEX (BMI) OF 38.0 TO 38.9 IN ADULT: ICD-10-CM

## 2024-03-22 RX ORDER — SEMAGLUTIDE 0.5 MG/.5ML
0.5 INJECTION, SOLUTION SUBCUTANEOUS
Qty: 2 ML | Refills: 0 | Status: SHIPPED | OUTPATIENT
Start: 2024-03-22 | End: 2024-04-13

## 2024-04-04 ENCOUNTER — TELEPHONE (OUTPATIENT)
Dept: PRIMARY CARE | Facility: CLINIC | Age: 49
End: 2024-04-04
Payer: COMMERCIAL

## 2024-04-11 ENCOUNTER — OFFICE VISIT (OUTPATIENT)
Dept: PRIMARY CARE | Facility: CLINIC | Age: 49
End: 2024-04-11
Payer: COMMERCIAL

## 2024-04-11 VITALS
DIASTOLIC BLOOD PRESSURE: 85 MMHG | WEIGHT: 263 LBS | SYSTOLIC BLOOD PRESSURE: 126 MMHG | HEART RATE: 91 BPM | BODY MASS INDEX: 37.65 KG/M2 | TEMPERATURE: 97.2 F | HEIGHT: 70 IN | OXYGEN SATURATION: 96 %

## 2024-04-11 DIAGNOSIS — I10 BENIGN ESSENTIAL HYPERTENSION: ICD-10-CM

## 2024-04-11 DIAGNOSIS — N20.0 KIDNEY STONE: Primary | ICD-10-CM

## 2024-04-11 DIAGNOSIS — F90.0 ATTENTION DEFICIT HYPERACTIVITY DISORDER (ADHD), PREDOMINANTLY INATTENTIVE TYPE: ICD-10-CM

## 2024-04-11 DIAGNOSIS — N18.9 CHRONIC KIDNEY DISEASE, UNSPECIFIED CKD STAGE: ICD-10-CM

## 2024-04-11 DIAGNOSIS — R52 PAIN: ICD-10-CM

## 2024-04-11 DIAGNOSIS — E78.2 MIXED HYPERLIPIDEMIA: ICD-10-CM

## 2024-04-11 DIAGNOSIS — E66.09 CLASS 2 OBESITY DUE TO EXCESS CALORIES WITHOUT SERIOUS COMORBIDITY WITH BODY MASS INDEX (BMI) OF 38.0 TO 38.9 IN ADULT: ICD-10-CM

## 2024-04-11 PROBLEM — R73.9 HYPERGLYCEMIA: Status: RESOLVED | Noted: 2023-12-12 | Resolved: 2024-04-11

## 2024-04-11 PROCEDURE — 99214 OFFICE O/P EST MOD 30 MIN: CPT | Performed by: INTERNAL MEDICINE

## 2024-04-11 PROCEDURE — 3079F DIAST BP 80-89 MM HG: CPT | Performed by: INTERNAL MEDICINE

## 2024-04-11 PROCEDURE — 3074F SYST BP LT 130 MM HG: CPT | Performed by: INTERNAL MEDICINE

## 2024-04-11 PROCEDURE — 3008F BODY MASS INDEX DOCD: CPT | Performed by: INTERNAL MEDICINE

## 2024-04-11 PROCEDURE — 1036F TOBACCO NON-USER: CPT | Performed by: INTERNAL MEDICINE

## 2024-04-11 RX ORDER — DEXTROAMPHETAMINE SACCHARATE, AMPHETAMINE ASPARTATE MONOHYDRATE, DEXTROAMPHETAMINE SULFATE AND AMPHETAMINE SULFATE 2.5; 2.5; 2.5; 2.5 MG/1; MG/1; MG/1; MG/1
10 CAPSULE, EXTENDED RELEASE ORAL EVERY MORNING
Qty: 30 CAPSULE | Refills: 0 | Status: SHIPPED | OUTPATIENT
Start: 2024-04-11 | End: 2024-05-11

## 2024-04-11 RX ORDER — SEMAGLUTIDE 1 MG/.5ML
1 INJECTION, SOLUTION SUBCUTANEOUS
Qty: 2 ML | Refills: 0 | Status: SHIPPED | OUTPATIENT
Start: 2024-04-14 | End: 2024-05-06

## 2024-04-11 RX ORDER — ATORVASTATIN CALCIUM 10 MG/1
10 TABLET, FILM COATED ORAL NIGHTLY
Qty: 90 TABLET | Refills: 3 | Status: SHIPPED | OUTPATIENT
Start: 2024-04-11

## 2024-04-11 NOTE — PROGRESS NOTES
Subjective   Patient ID: Calderon Hernandez is a 49 y.o. male who presents for Follow-up (4 month) and Back Pain (Worried about a kidney stone ).    Assessment/Plan     Problem List Items Addressed This Visit       Benign essential hypertension     Patients BP readings reviewed and addressed, as we age our arteries turn stiffer and less elastic. Restricting salt consumption and staying physically fit with regular exercise regimen is the only way to keep our vasculature less tonic. Studies have shown that keeping ideal body wt, exercise routine about 140 to 150 minutes a week, eating variety of plant based diet and drinking plentiful water are quite helpful. Monitor BP twice or once a week at home and bring log to be reviewed by me. Uncontrolled BP has long term consequences including heart failure, myocardial infarction, accelerated atherosclerosis and kidney dysfunction. Therapy reviewed and explained.           Class 2 obesity with body mass index (BMI) of 38.0 to 38.9 in adult     I spent <15 minutes face to face with individual providing recommendations for nutrition choices and exercise plan to help achieve weight reduction goals. Obesity is systemic disorder and it can bring devastating morbidities in furture. It is a matter of calorie gain and loss, keeping bodybank in negative calorie balance mode is the way to sustain weight loss.Diet has a big role in reducing excess body wt. Scheduled and well planned meals and food intake with watchfulness and understanding of calorie portion and distribution is key to understand. Bariatric surgery is another option if sustained wt loss is not achieved and faced with one or more comorbidities with morbid obesity. Weigh yourself twice a week to understand and follow wt loss goals.           Relevant Medications    semaglutide, weight loss, (Wegovy) 1 mg/0.5 mL pen injector (Start on 4/14/2024)    Other Relevant Orders    Hepatitis C antibody    HIV 1/2 Antigen/Antibody  Screen with Reflex to Confirmation    Albumin , Urine Random    CBC and Auto Differential    Comprehensive Metabolic Panel    Lipid Panel    Prostate Specific Antigen, Screen    TSH with reflex to Free T4 if abnormal    Uric Acid    Microscopic Only, Urine    Drug Screen, Urine With Reflex to Confirmation    Mixed hyperlipidemia    Relevant Medications    atorvastatin (Lipitor) 10 mg tablet    Other Relevant Orders    Hepatitis C antibody    HIV 1/2 Antigen/Antibody Screen with Reflex to Confirmation    Albumin , Urine Random    CBC and Auto Differential    Comprehensive Metabolic Panel    Lipid Panel    Prostate Specific Antigen, Screen    TSH with reflex to Free T4 if abnormal    Uric Acid    Microscopic Only, Urine    Drug Screen, Urine With Reflex to Confirmation    Attention deficit hyperactivity disorder (ADHD), predominantly inattentive type     I personally reviewed the OARRS report for this patient. This report is scanned into the electronic medical record. I have considered  the risks of abuse, dependence, addiction and diversion. After discussion, patient does have a good understanding of the safety and  risks of this medication. I believe that it is clinically appropriate for this patient to be prescribed this medication.  See patient back in 6 weeks.         Relevant Medications    amphetamine-dextroamphetamine XR (Adderall XR) 10 mg 24 hr capsule    Other Relevant Orders    Hepatitis C antibody    HIV 1/2 Antigen/Antibody Screen with Reflex to Confirmation    Albumin , Urine Random    CBC and Auto Differential    Comprehensive Metabolic Panel    Lipid Panel    Prostate Specific Antigen, Screen    TSH with reflex to Free T4 if abnormal    Uric Acid    Microscopic Only, Urine    Drug Screen, Urine With Reflex to Confirmation    Chronic kidney disease    Relevant Orders    US renal complete    Pain    Relevant Orders    XR abdomen 2 views supine and erect or decub    Kidney stone - Primary       HPI  49-year-old patient with ADD ADHD hypertension hyperlipidemia metabolic syndrome family history of kidney stone complaining of the recurrent kidney pain back pain urgency frequency without any hematuria    Negative for headache nausea vomiting diarrhea    Negative for anxiety depression dementia    Negative for chemical abuse    Negative for diarrhea or recent traveling    Review laboratory medication obesity advised to get diet exercise and sleep apnea test    Discussed about dependency on Adderall discussed about the side effect and also the known dependency medications about Strattera and buprenorphine      Past Medical History:   Diagnosis Date    Chronic fatigue 04/06/2023    Closed fracture of transverse process of lumbar vertebra (CMS/HCC) 11/03/2019    Fracture of humeral shaft, right, closed 11/02/2019    Personal history of other endocrine, nutritional and metabolic disease 10/25/2021    History of obesity    Plantar fasciitis 10/19/2017    Sprain of anterior talofibular ligament of right ankle 10/19/2017    Tendonitis 10/19/2017    Tinea pedis of right foot 03/26/2018    Unspecified asthma, uncomplicated 10/25/2021    Acute asthmatic bronchitis     Past Surgical History:   Procedure Laterality Date    OTHER SURGICAL HISTORY  10/25/2021    Humerus fracture repair    OTHER SURGICAL HISTORY  10/25/2021    Inguinal hernia repair     No Known Allergies  Current Outpatient Medications   Medication Sig Dispense Refill    aspirin 81 mg chewable tablet Chew 1 tablet (81 mg) once daily. 90 tablet 3    metFORMIN (Glucophage) 500 mg tablet Take 1 tablet (500 mg) by mouth once daily with a meal. 90 tablet 3    olmesartan-hydrochlorothiazide (Benicar HCT) 40-25 mg tablet Take 1 tablet by mouth once daily. 90 tablet 3    semaglutide, weight loss, (Wegovy) 0.5 mg/0.5 mL pen injector Inject 0.5 mg under the skin 1 (one) time per week for 4 doses. 2 mL 0    amphetamine-dextroamphetamine XR (Adderall XR) 10 mg 24 hr capsule  "Take 1 capsule (10 mg) by mouth once daily in the morning. Do not crush or chew. 30 capsule 0    atorvastatin (Lipitor) 10 mg tablet Take 1 tablet (10 mg) by mouth once daily at bedtime. 90 tablet 3    [START ON 4/14/2024] semaglutide, weight loss, (Wegovy) 1 mg/0.5 mL pen injector Inject 1 mg under the skin 1 (one) time per week for 4 doses. 2 mL 0     No current facility-administered medications for this visit.     Family History   Problem Relation Name Age of Onset    Stroke Mother      Other (vascular disorder) Father       Social History     Socioeconomic History    Marital status: Unknown     Spouse name: None    Number of children: None    Years of education: None    Highest education level: None   Occupational History    None   Tobacco Use    Smoking status: Former     Types: Cigarettes    Smokeless tobacco: Never   Substance and Sexual Activity    Alcohol use: Yes     Comment: social    Drug use: Never    Sexual activity: None   Other Topics Concern    None   Social History Narrative    None     Social Determinants of Health     Financial Resource Strain: Not on file   Food Insecurity: Not on file   Transportation Needs: Not on file   Physical Activity: Not on file   Stress: Not on file   Social Connections: Not on file   Intimate Partner Violence: Not on file   Housing Stability: Not on file     Immunization History   Administered Date(s) Administered    Influenza, Unspecified 09/29/2010       Review of Systems  Review of systems is otherwise negative unless stated above or in history of present illness.    Objective   Visit Vitals  /85 (BP Location: Left arm, Patient Position: Sitting, BP Cuff Size: Large adult)   Pulse 91   Temp 36.2 °C (97.2 °F)   Ht 1.778 m (5' 10\")   Wt 119 kg (263 lb)   SpO2 96%   BMI 37.74 kg/m²   Smoking Status Former   BSA 2.42 m²     Physical Exam  Constitutional: BMI 37     General: not in acute distress.   HENT:      Head: Normocephalic and atraumatic.      Nose: Nose " normal.   Eyes: No jaundice     Extraocular Movements: Extraocular movements intact.      Conjunctiva/sclera: Conjunctivae normal.   Cardiovascular: S4     Rate and Rhythm: Normal rate ,  No M/R/G  Pulmonary:      Effort: Pulmonary effort is normal.      Breath sounds: Normal, Bilat Equal AE  Skin: Dry skin     General: Skin is warm.   Neurological:      Mental Status: He is alert and oriented to person, place, and time.   Psychiatric:         Mood and Affect: Mood normal.         Behavior: Behavior normal.   Musculoskeletal back pain  FROM in all extremitirs,  Joint-no swelling or tenderness  Both kidney pain    No visits with results within 4 Month(s) from this visit.   Latest known visit with results is:   Lab on 10/03/2023   Component Date Value Ref Range Status    WBC 10/03/2023 5.1  4.4 - 11.3 x10*3/uL Final    nRBC 10/03/2023 0.0  0.0 - 0.0 /100 WBCs Final    RBC 10/03/2023 5.01  4.50 - 5.90 x10*6/uL Final    Hemoglobin 10/03/2023 16.4  13.5 - 17.5 g/dL Final    Hematocrit 10/03/2023 48.1  41.0 - 52.0 % Final    MCV 10/03/2023 96  80 - 100 fL Final    MCH 10/03/2023 32.7  26.0 - 34.0 pg Final    MCHC 10/03/2023 34.1  32.0 - 36.0 g/dL Final    RDW 10/03/2023 12.9  11.5 - 14.5 % Final    Platelets 10/03/2023 281  150 - 450 x10*3/uL Final    MPV 10/03/2023 10.1  7.5 - 11.5 fL Final    Neutrophils % 10/03/2023 57.2  40.0 - 80.0 % Final    Immature Granulocytes %, Automated 10/03/2023 0.2  0.0 - 0.9 % Final    Lymphocytes % 10/03/2023 30.1  13.0 - 44.0 % Final    Monocytes % 10/03/2023 9.5  2.0 - 10.0 % Final    Eosinophils % 10/03/2023 2.0  0.0 - 6.0 % Final    Basophils % 10/03/2023 1.0  0.0 - 2.0 % Final    Neutrophils Absolute 10/03/2023 2.89  1.20 - 7.70 x10*3/uL Final    Immature Granulocytes Absolute, Au* 10/03/2023 0.01  0.00 - 0.70 x10*3/uL Final    Lymphocytes Absolute 10/03/2023 1.52  1.20 - 4.80 x10*3/uL Final    Monocytes Absolute 10/03/2023 0.48  0.10 - 1.00 x10*3/uL Final    Eosinophils Absolute  10/03/2023 0.10  0.00 - 0.70 x10*3/uL Final    Basophils Absolute 10/03/2023 0.05  0.00 - 0.10 x10*3/uL Final    Glucose 10/03/2023 96  74 - 99 mg/dL Final    Sodium 10/03/2023 138  136 - 145 mmol/L Final    Potassium 10/03/2023 4.3  3.5 - 5.3 mmol/L Final    Chloride 10/03/2023 99  98 - 107 mmol/L Final    Bicarbonate 10/03/2023 27  21 - 32 mmol/L Final    Anion Gap 10/03/2023 16  10 - 20 mmol/L Final    Urea Nitrogen 10/03/2023 24 (H)  6 - 23 mg/dL Final    Creatinine 10/03/2023 0.92  0.50 - 1.30 mg/dL Final    eGFR 10/03/2023 >90  >60 mL/min/1.73m*2 Final    Calcium 10/03/2023 10.3  8.6 - 10.6 mg/dL Final    Albumin 10/03/2023 5.1 (H)  3.4 - 5.0 g/dL Final    Alkaline Phosphatase 10/03/2023 46  33 - 120 U/L Final    Total Protein 10/03/2023 7.6  6.4 - 8.2 g/dL Final    AST 10/03/2023 15  9 - 39 U/L Final    Bilirubin, Total 10/03/2023 0.7  0.0 - 1.2 mg/dL Final    ALT 10/03/2023 22  10 - 52 U/L Final    Cholesterol 10/03/2023 139  0 - 199 mg/dL Final    HDL-Cholesterol 10/03/2023 54.1  mg/dL Final    Cholesterol/HDL Ratio 10/03/2023 2.6   Final    LDL Calculated 10/03/2023 68 (L)  140 - 190 mg/dL Final    VLDL 10/03/2023 17  0 - 40 mg/dL Final    Triglycerides 10/03/2023 86  0 - 149 mg/dL Final    Non HDL Cholesterol 10/03/2023 85  0 - 149 mg/dL Final    Thyroid Stimulating Hormone 10/03/2023 0.93  0.44 - 3.98 mIU/L Final       Radiology: Reviewed imaging in powerchart.  No results found.      Charting was completed using voice recognition technology and may include unintended errors.

## 2024-05-06 ENCOUNTER — HOSPITAL ENCOUNTER (OUTPATIENT)
Dept: RADIOLOGY | Facility: CLINIC | Age: 49
Discharge: HOME | End: 2024-05-06
Payer: COMMERCIAL

## 2024-05-06 DIAGNOSIS — N18.9 CHRONIC KIDNEY DISEASE, UNSPECIFIED CKD STAGE: ICD-10-CM

## 2024-05-06 PROCEDURE — 76770 US EXAM ABDO BACK WALL COMP: CPT

## 2024-05-21 DIAGNOSIS — E66.09 CLASS 2 OBESITY DUE TO EXCESS CALORIES WITHOUT SERIOUS COMORBIDITY WITH BODY MASS INDEX (BMI) OF 38.0 TO 38.9 IN ADULT: ICD-10-CM

## 2024-05-21 RX ORDER — SEMAGLUTIDE 1.7 MG/.75ML
1.7 INJECTION, SOLUTION SUBCUTANEOUS
Qty: 3 ML | Refills: 0 | Status: SHIPPED | OUTPATIENT
Start: 2024-05-26 | End: 2024-06-17

## 2024-06-27 DIAGNOSIS — E66.09 CLASS 2 OBESITY DUE TO EXCESS CALORIES WITHOUT SERIOUS COMORBIDITY WITH BODY MASS INDEX (BMI) OF 38.0 TO 38.9 IN ADULT: ICD-10-CM

## 2024-06-27 RX ORDER — SEMAGLUTIDE 2.4 MG/.75ML
2.4 INJECTION, SOLUTION SUBCUTANEOUS
Qty: 3 ML | Refills: 3 | Status: SHIPPED | OUTPATIENT
Start: 2024-06-30

## 2024-07-15 ENCOUNTER — APPOINTMENT (OUTPATIENT)
Dept: PRIMARY CARE | Facility: CLINIC | Age: 49
End: 2024-07-15
Payer: COMMERCIAL

## 2024-10-28 ENCOUNTER — TELEMEDICINE (OUTPATIENT)
Dept: PRIMARY CARE | Facility: CLINIC | Age: 49
End: 2024-10-28
Payer: COMMERCIAL

## 2024-10-28 VITALS — HEIGHT: 71 IN | WEIGHT: 250 LBS | BODY MASS INDEX: 35 KG/M2

## 2024-10-28 DIAGNOSIS — H10.33 ACUTE BACTERIAL CONJUNCTIVITIS OF BOTH EYES: Primary | ICD-10-CM

## 2024-10-28 DIAGNOSIS — N18.2 STAGE 2 CHRONIC KIDNEY DISEASE: ICD-10-CM

## 2024-10-28 DIAGNOSIS — I10 BENIGN ESSENTIAL HYPERTENSION: ICD-10-CM

## 2024-10-28 DIAGNOSIS — E78.2 MIXED HYPERLIPIDEMIA: ICD-10-CM

## 2024-10-28 PROBLEM — F90.0 ATTENTION DEFICIT HYPERACTIVITY DISORDER (ADHD), PREDOMINANTLY INATTENTIVE TYPE: Status: RESOLVED | Noted: 2023-08-14 | Resolved: 2024-10-28

## 2024-10-28 PROBLEM — N20.0 KIDNEY STONE: Status: RESOLVED | Noted: 2024-04-11 | Resolved: 2024-10-28

## 2024-10-28 PROBLEM — E66.812 CLASS 2 OBESITY WITH BODY MASS INDEX (BMI) OF 38.0 TO 38.9 IN ADULT: Status: RESOLVED | Noted: 2023-02-27 | Resolved: 2024-10-28

## 2024-10-28 PROBLEM — R52 PAIN: Status: RESOLVED | Noted: 2024-04-11 | Resolved: 2024-10-28

## 2024-10-28 PROCEDURE — 1036F TOBACCO NON-USER: CPT | Performed by: INTERNAL MEDICINE

## 2024-10-28 PROCEDURE — 3008F BODY MASS INDEX DOCD: CPT | Performed by: INTERNAL MEDICINE

## 2024-10-28 PROCEDURE — 99213 OFFICE O/P EST LOW 20 MIN: CPT | Performed by: INTERNAL MEDICINE

## 2024-10-28 RX ORDER — AZITHROMYCIN 250 MG/1
TABLET, FILM COATED ORAL
Qty: 6 TABLET | Refills: 0 | Status: SHIPPED | OUTPATIENT
Start: 2024-10-28 | End: 2024-11-02

## 2024-10-28 RX ORDER — TOBRAMYCIN 3 MG/ML
3 SOLUTION/ DROPS OPHTHALMIC 3 TIMES DAILY
Qty: 5 ML | Refills: 0 | Status: SHIPPED | OUTPATIENT
Start: 2024-10-28 | End: 2024-11-02

## 2024-10-28 ASSESSMENT — PATIENT HEALTH QUESTIONNAIRE - PHQ9
2. FEELING DOWN, DEPRESSED OR HOPELESS: NOT AT ALL
SUM OF ALL RESPONSES TO PHQ9 QUESTIONS 1 AND 2: 0
1. LITTLE INTEREST OR PLEASURE IN DOING THINGS: NOT AT ALL

## 2025-02-04 ENCOUNTER — APPOINTMENT (OUTPATIENT)
Dept: PRIMARY CARE | Facility: CLINIC | Age: 50
End: 2025-02-04
Payer: COMMERCIAL

## 2025-02-04 VITALS
DIASTOLIC BLOOD PRESSURE: 77 MMHG | SYSTOLIC BLOOD PRESSURE: 115 MMHG | HEART RATE: 72 BPM | OXYGEN SATURATION: 97 % | WEIGHT: 272.6 LBS | TEMPERATURE: 97.3 F | BODY MASS INDEX: 39.03 KG/M2 | HEIGHT: 70 IN

## 2025-02-04 DIAGNOSIS — F90.0 ATTENTION DEFICIT HYPERACTIVITY DISORDER (ADHD), PREDOMINANTLY INATTENTIVE TYPE: Primary | ICD-10-CM

## 2025-02-04 DIAGNOSIS — E66.09 CLASS 2 OBESITY DUE TO EXCESS CALORIES WITHOUT SERIOUS COMORBIDITY WITH BODY MASS INDEX (BMI) OF 38.0 TO 38.9 IN ADULT: ICD-10-CM

## 2025-02-04 DIAGNOSIS — I10 BENIGN ESSENTIAL HYPERTENSION: ICD-10-CM

## 2025-02-04 DIAGNOSIS — E78.2 MIXED HYPERLIPIDEMIA: ICD-10-CM

## 2025-02-04 DIAGNOSIS — E66.812 CLASS 2 OBESITY DUE TO EXCESS CALORIES WITHOUT SERIOUS COMORBIDITY WITH BODY MASS INDEX (BMI) OF 38.0 TO 38.9 IN ADULT: ICD-10-CM

## 2025-02-04 PROBLEM — N18.9 CHRONIC KIDNEY DISEASE: Status: RESOLVED | Noted: 2024-04-11 | Resolved: 2025-02-04

## 2025-02-04 PROBLEM — H10.33 ACUTE BACTERIAL CONJUNCTIVITIS OF BOTH EYES: Status: RESOLVED | Noted: 2024-10-28 | Resolved: 2025-02-04

## 2025-02-04 LAB — PSA SERPL-MCNC: 0.67 NG/ML

## 2025-02-04 PROCEDURE — 3008F BODY MASS INDEX DOCD: CPT | Performed by: INTERNAL MEDICINE

## 2025-02-04 PROCEDURE — 99214 OFFICE O/P EST MOD 30 MIN: CPT | Performed by: INTERNAL MEDICINE

## 2025-02-04 PROCEDURE — 3074F SYST BP LT 130 MM HG: CPT | Performed by: INTERNAL MEDICINE

## 2025-02-04 PROCEDURE — 3078F DIAST BP <80 MM HG: CPT | Performed by: INTERNAL MEDICINE

## 2025-02-04 PROCEDURE — 1036F TOBACCO NON-USER: CPT | Performed by: INTERNAL MEDICINE

## 2025-02-04 RX ORDER — PHENTERMINE HYDROCHLORIDE 37.5 MG/1
37.5 TABLET ORAL
Qty: 30 TABLET | Refills: 0 | Status: SHIPPED | OUTPATIENT
Start: 2025-02-04 | End: 2025-02-06 | Stop reason: SDUPTHER

## 2025-02-04 RX ORDER — DEXTROAMPHETAMINE SACCHARATE, AMPHETAMINE ASPARTATE MONOHYDRATE, DEXTROAMPHETAMINE SULFATE AND AMPHETAMINE SULFATE 2.5; 2.5; 2.5; 2.5 MG/1; MG/1; MG/1; MG/1
10 CAPSULE, EXTENDED RELEASE ORAL EVERY MORNING
Qty: 30 CAPSULE | Refills: 0 | Status: SHIPPED | OUTPATIENT
Start: 2025-02-04 | End: 2025-03-06

## 2025-02-04 RX ORDER — OLMESARTAN MEDOXOMIL AND HYDROCHLOROTHIAZIDE 40/25 40; 25 MG/1; MG/1
1 TABLET ORAL DAILY
Qty: 90 TABLET | Refills: 3 | Status: SHIPPED | OUTPATIENT
Start: 2025-02-04

## 2025-02-04 RX ORDER — ATORVASTATIN CALCIUM 10 MG/1
10 TABLET, FILM COATED ORAL NIGHTLY
Qty: 90 TABLET | Refills: 3 | Status: SHIPPED | OUTPATIENT
Start: 2025-02-04

## 2025-02-04 ASSESSMENT — PATIENT HEALTH QUESTIONNAIRE - PHQ9
SUM OF ALL RESPONSES TO PHQ9 QUESTIONS 1 AND 2: 0
1. LITTLE INTEREST OR PLEASURE IN DOING THINGS: NOT AT ALL
2. FEELING DOWN, DEPRESSED OR HOPELESS: NOT AT ALL

## 2025-02-04 NOTE — ASSESSMENT & PLAN NOTE
PHQ less than 6 patient will continue Adderall 10 mg daily I personally reviewed the OARRS report for this patient. This report is scanned into the electronic medical record. I have considered  the risks of abuse, dependence, addiction and diversion. After discussion, patient does have a good understanding of the safety and  risks of this medication. I believe that it is clinically appropriate for this patient to be prescribed this medication.  See patient back in 6 weeks.

## 2025-02-04 NOTE — ASSESSMENT & PLAN NOTE
BMI 39 given 1800-calorie diet exercise 3 times a week Adipex I personally reviewed the OARRS report for this patient. This report is scanned into the electronic medical record. I have considered  the risks of abuse, dependence, addiction and diversion. After discussion, patient does have a good understanding of the safety and  risks of this medication. I believe that it is clinically appropriate for this patient to be prescribed this medication.  See patient back in 6 weeks.

## 2025-02-04 NOTE — ASSESSMENT & PLAN NOTE
Aspirin 81 mg a day folic acid 1 mg a day Benicar 40/20 5 in the morning BMP uric acid magnesium twice a year

## 2025-02-04 NOTE — PROGRESS NOTES
Subjective   Patient ID: Calderon Hernandez is a 49 y.o. male who presents for Follow-up (Discuss weight ).    Assessment/Plan     Problem List Items Addressed This Visit       Benign essential hypertension     Aspirin 81 mg a day folic acid 1 mg a day Benicar 40/20 5 in the morning BMP uric acid magnesium twice a year         Relevant Medications    olmesartan-hydrochlorothiazide (Benicar HCT) 40-25 mg tablet    Class 2 obesity due to excess calories without serious comorbidity with body mass index (BMI) of 38.0 to 38.9 in adult     BMI 39 given 1800-calorie diet exercise 3 times a week Adipex I personally reviewed the OARRS report for this patient. This report is scanned into the electronic medical record. I have considered  the risks of abuse, dependence, addiction and diversion. After discussion, patient does have a good understanding of the safety and  risks of this medication. I believe that it is clinically appropriate for this patient to be prescribed this medication.  See patient back in 6 weeks.         Mixed hyperlipidemia     Low-fat diet exercise CoQ 10 Lipitor 10 mg a day CMP CPK lipid twice a year follow-up         Relevant Medications    atorvastatin (Lipitor) 10 mg tablet    Attention deficit hyperactivity disorder (ADHD), predominantly inattentive type - Primary     PHQ less than 6 patient will continue Adderall 10 mg daily I personally reviewed the OARRS report for this patient. This report is scanned into the electronic medical record. I have considered  the risks of abuse, dependence, addiction and diversion. After discussion, patient does have a good understanding of the safety and  risks of this medication. I believe that it is clinically appropriate for this patient to be prescribed this medication.  See patient back in 6 weeks.         Relevant Medications    amphetamine-dextroamphetamine XR (Adderall XR) 10 mg 24 hr capsule    Other Relevant Orders    Drug Screen, Urine With Reflex to  Confirmation     Patient was evaluated today, problem list was reviewed, problems and concerns addressed, Rx list reviewed and updated, lab and tests were noted and reviewed. Life style changes were discussed, always it works better if we eat plant based diet and plenty of fibres and roughage. Consume adequate amount of water and avoid alcohol, light to moderate physical activities and stress reduction are always beneficial for ongoing physical well being. Do not forget to have 6 to 7 hours of sleep regularly and avoid late night fredo screen exposure.    HPI 49-year-old patient  with the children    Personal history of obesity hyperlipidemia hyperglycemia ADD ADHD hypertension with proteinuria    Negative for headache chest pain    Negative for suicide    Negative for hallucination delirium    Negative for COVID or flu    Negative for fall    Advise continue diet exercise plus low-fat low-carb diet    Continue Adderall patient did try the Wegovy diet exercise not able to lose weight weight induced arthralgia myalgia snoring at night    Given 1800-calorie diet 10,000 steps a day plus Adipex    Follow-up 4 weeks  Past Medical History:   Diagnosis Date    Chronic fatigue 04/06/2023    Closed fracture of transverse process of lumbar vertebra (Multi) 11/03/2019    Fracture of humeral shaft, right, closed 11/02/2019    Personal history of other endocrine, nutritional and metabolic disease 10/25/2021    History of obesity    Plantar fasciitis 10/19/2017    Sprain of anterior talofibular ligament of right ankle 10/19/2017    Tendonitis 10/19/2017    Tinea pedis of right foot 03/26/2018    Unspecified asthma, uncomplicated ([facility]) 10/25/2021    Acute asthmatic bronchitis     Past Surgical History:   Procedure Laterality Date    OTHER SURGICAL HISTORY  10/25/2021    Humerus fracture repair    OTHER SURGICAL HISTORY  10/25/2021    Inguinal hernia repair     No Known Allergies  Current Outpatient Medications  "  Medication Sig Dispense Refill    amphetamine-dextroamphetamine XR (Adderall XR) 10 mg 24 hr capsule Take 1 capsule (10 mg) by mouth once daily in the morning. Do not crush or chew. 30 capsule 0    atorvastatin (Lipitor) 10 mg tablet Take 1 tablet (10 mg) by mouth once daily at bedtime. 90 tablet 3    olmesartan-hydrochlorothiazide (Benicar HCT) 40-25 mg tablet Take 1 tablet by mouth once daily. 90 tablet 3     No current facility-administered medications for this visit.     Family History   Problem Relation Name Age of Onset    Stroke Mother      Other (vascular disorder) Father       Social History     Socioeconomic History    Marital status: Unknown   Tobacco Use    Smoking status: Former     Types: Cigarettes    Smokeless tobacco: Never   Substance and Sexual Activity    Alcohol use: Yes     Comment: social    Drug use: Never     Immunization History   Administered Date(s) Administered    Influenza, Unspecified 09/29/2010       Review of Systems  Review of systems is otherwise negative unless stated above or in history of present illness.    Objective   Visit Vitals  /77   Pulse 72   Temp 36.3 °C (97.3 °F)   Ht 1.778 m (5' 10\")   Wt 124 kg (272 lb 9.6 oz)   SpO2 97%   BMI 39.11 kg/m²   Smoking Status Former   BSA 2.47 m²     Physical Exam  Constitutional: BMI 39     General: not in acute distress.   HENT:      Head: Normocephalic and atraumatic.      Nose: Nose normal.   Eyes: No jaundice     Extraocular Movements: Extraocular movements intact.      Conjunctiva/sclera: Conjunctivae normal.   Cardiovascular: Systolic heart murmur     Rate and Rhythm: Normal rate ,  No M/R/G  Pulmonary:      Effort: Pulmonary effort is normal.      Breath sounds: Normal, Bilat Equal AE  Skin: Dry skin     General: Skin is warm.   Neurological:      Mental Status: He is alert and oriented to person, place, and time.   Psychiatric:    ADHD without suicide     Mood and Affect: Mood normal.         Behavior: Behavior normal. "   Musculoskeletal   FROM in all extremitirs,  Joint-no swelling or tenderness    No visits with results within 4 Month(s) from this visit.   Latest known visit with results is:   Lab on 10/03/2023   Component Date Value Ref Range Status    WBC 10/03/2023 5.1  4.4 - 11.3 x10*3/uL Final    nRBC 10/03/2023 0.0  0.0 - 0.0 /100 WBCs Final    RBC 10/03/2023 5.01  4.50 - 5.90 x10*6/uL Final    Hemoglobin 10/03/2023 16.4  13.5 - 17.5 g/dL Final    Hematocrit 10/03/2023 48.1  41.0 - 52.0 % Final    MCV 10/03/2023 96  80 - 100 fL Final    MCH 10/03/2023 32.7  26.0 - 34.0 pg Final    MCHC 10/03/2023 34.1  32.0 - 36.0 g/dL Final    RDW 10/03/2023 12.9  11.5 - 14.5 % Final    Platelets 10/03/2023 281  150 - 450 x10*3/uL Final    MPV 10/03/2023 10.1  7.5 - 11.5 fL Final    Neutrophils % 10/03/2023 57.2  40.0 - 80.0 % Final    Immature Granulocytes %, Automated 10/03/2023 0.2  0.0 - 0.9 % Final    Lymphocytes % 10/03/2023 30.1  13.0 - 44.0 % Final    Monocytes % 10/03/2023 9.5  2.0 - 10.0 % Final    Eosinophils % 10/03/2023 2.0  0.0 - 6.0 % Final    Basophils % 10/03/2023 1.0  0.0 - 2.0 % Final    Neutrophils Absolute 10/03/2023 2.89  1.20 - 7.70 x10*3/uL Final    Immature Granulocytes Absolute, Au* 10/03/2023 0.01  0.00 - 0.70 x10*3/uL Final    Lymphocytes Absolute 10/03/2023 1.52  1.20 - 4.80 x10*3/uL Final    Monocytes Absolute 10/03/2023 0.48  0.10 - 1.00 x10*3/uL Final    Eosinophils Absolute 10/03/2023 0.10  0.00 - 0.70 x10*3/uL Final    Basophils Absolute 10/03/2023 0.05  0.00 - 0.10 x10*3/uL Final    Glucose 10/03/2023 96  74 - 99 mg/dL Final    Sodium 10/03/2023 138  136 - 145 mmol/L Final    Potassium 10/03/2023 4.3  3.5 - 5.3 mmol/L Final    Chloride 10/03/2023 99  98 - 107 mmol/L Final    Bicarbonate 10/03/2023 27  21 - 32 mmol/L Final    Anion Gap 10/03/2023 16  10 - 20 mmol/L Final    Urea Nitrogen 10/03/2023 24 (H)  6 - 23 mg/dL Final    Creatinine 10/03/2023 0.92  0.50 - 1.30 mg/dL Final    eGFR 10/03/2023 >90  >60  mL/min/1.73m*2 Final    Calcium 10/03/2023 10.3  8.6 - 10.6 mg/dL Final    Albumin 10/03/2023 5.1 (H)  3.4 - 5.0 g/dL Final    Alkaline Phosphatase 10/03/2023 46  33 - 120 U/L Final    Total Protein 10/03/2023 7.6  6.4 - 8.2 g/dL Final    AST 10/03/2023 15  9 - 39 U/L Final    Bilirubin, Total 10/03/2023 0.7  0.0 - 1.2 mg/dL Final    ALT 10/03/2023 22  10 - 52 U/L Final    Cholesterol 10/03/2023 139  0 - 199 mg/dL Final    HDL-Cholesterol 10/03/2023 54.1  mg/dL Final    Cholesterol/HDL Ratio 10/03/2023 2.6   Final    LDL Calculated 10/03/2023 68 (L)  140 - 190 mg/dL Final    VLDL 10/03/2023 17  0 - 40 mg/dL Final    Triglycerides 10/03/2023 86  0 - 149 mg/dL Final    Non HDL Cholesterol 10/03/2023 85  0 - 149 mg/dL Final    Thyroid Stimulating Hormone 10/03/2023 0.93  0.44 - 3.98 mIU/L Final       Radiology: Reviewed imaging in powerchart.  No results found.      Charting was completed using voice recognition technology and may include unintended errors.

## 2025-02-05 LAB
ALBUMIN SERPL-MCNC: 4.7 G/DL (ref 3.6–5.1)
ALP SERPL-CCNC: 44 U/L (ref 35–144)
ALT SERPL-CCNC: 46 U/L (ref 9–46)
AMPHETAMINES UR QL: NEGATIVE NG/ML
ANION GAP SERPL CALCULATED.4IONS-SCNC: 9 MMOL/L (CALC) (ref 7–17)
AST SERPL-CCNC: 24 U/L (ref 10–35)
BARBITURATES UR QL: NEGATIVE NG/ML
BASOPHILS # BLD AUTO: 48 CELLS/UL (ref 0–200)
BASOPHILS NFR BLD AUTO: 1.2 %
BENZODIAZ UR QL: NEGATIVE NG/ML
BILIRUB SERPL-MCNC: 0.5 MG/DL (ref 0.2–1.2)
BUN SERPL-MCNC: 25 MG/DL (ref 7–25)
BZE UR QL: NEGATIVE NG/ML
CALCIUM SERPL-MCNC: 9.4 MG/DL (ref 8.6–10.3)
CHLORIDE SERPL-SCNC: 105 MMOL/L (ref 98–110)
CHOLEST SERPL-MCNC: 147 MG/DL
CHOLEST/HDLC SERPL: 2.7 (CALC)
CO2 SERPL-SCNC: 25 MMOL/L (ref 20–32)
CREAT SERPL-MCNC: 0.86 MG/DL (ref 0.7–1.3)
CREAT UR-MCNC: 151.1 MG/DL
EGFRCR SERPLBLD CKD-EPI 2021: 105 ML/MIN/1.73M2
EOSINOPHIL # BLD AUTO: 160 CELLS/UL (ref 15–500)
EOSINOPHIL NFR BLD AUTO: 4 %
ERYTHROCYTE [DISTWIDTH] IN BLOOD BY AUTOMATED COUNT: 12.4 % (ref 11–15)
GLUCOSE SERPL-MCNC: 120 MG/DL (ref 65–99)
HCT VFR BLD AUTO: 49 % (ref 38.5–50)
HCV AB SERPL QL IA: NORMAL
HDLC SERPL-MCNC: 55 MG/DL
HGB BLD-MCNC: 16.3 G/DL (ref 13.2–17.1)
HIV 1+2 AB+HIV1 P24 AG SERPL QL IA: NORMAL
LDLC SERPL CALC-MCNC: 75 MG/DL (CALC)
LYMPHOCYTES # BLD AUTO: 1356 CELLS/UL (ref 850–3900)
LYMPHOCYTES NFR BLD AUTO: 33.9 %
MCH RBC QN AUTO: 30.9 PG (ref 27–33)
MCHC RBC AUTO-ENTMCNC: 33.3 G/DL (ref 32–36)
MCV RBC AUTO: 92.8 FL (ref 80–100)
METHADONE UR QL: NEGATIVE NG/ML
MONOCYTES # BLD AUTO: 388 CELLS/UL (ref 200–950)
MONOCYTES NFR BLD AUTO: 9.7 %
NEUTROPHILS # BLD AUTO: 2048 CELLS/UL (ref 1500–7800)
NEUTROPHILS NFR BLD AUTO: 51.2 %
NONHDLC SERPL-MCNC: 92 MG/DL (CALC)
OPIATES UR QL: NEGATIVE NG/ML
OXIDANTS UR QL: NEGATIVE MCG/ML
OXYCODONE UR QL: NEGATIVE NG/ML
PCP UR QL: NEGATIVE NG/ML
PH UR: 6.7 [PH] (ref 4.5–9)
PLATELET # BLD AUTO: 244 THOUSAND/UL (ref 140–400)
PMV BLD REES-ECKER: 10.6 FL (ref 7.5–12.5)
POTASSIUM SERPL-SCNC: 3.9 MMOL/L (ref 3.5–5.3)
PROT SERPL-MCNC: 7 G/DL (ref 6.1–8.1)
QUEST NOTES AND COMMENTS: NORMAL
RBC # BLD AUTO: 5.28 MILLION/UL (ref 4.2–5.8)
SODIUM SERPL-SCNC: 139 MMOL/L (ref 135–146)
THC UR QL: NEGATIVE NG/ML
TRIGL SERPL-MCNC: 83 MG/DL
TSH SERPL-ACNC: 1.1 MIU/L (ref 0.4–4.5)
URATE SERPL-MCNC: 6.2 MG/DL (ref 4–8)
WBC # BLD AUTO: 4 THOUSAND/UL (ref 3.8–10.8)

## 2025-02-06 ENCOUNTER — TELEPHONE (OUTPATIENT)
Dept: PRIMARY CARE | Facility: CLINIC | Age: 50
End: 2025-02-06
Payer: COMMERCIAL

## 2025-02-06 DIAGNOSIS — E66.09 CLASS 2 OBESITY DUE TO EXCESS CALORIES WITHOUT SERIOUS COMORBIDITY WITH BODY MASS INDEX (BMI) OF 38.0 TO 38.9 IN ADULT: ICD-10-CM

## 2025-02-06 DIAGNOSIS — E66.812 CLASS 2 OBESITY DUE TO EXCESS CALORIES WITHOUT SERIOUS COMORBIDITY WITH BODY MASS INDEX (BMI) OF 38.0 TO 38.9 IN ADULT: ICD-10-CM

## 2025-02-06 RX ORDER — PHENTERMINE HYDROCHLORIDE 37.5 MG/1
37.5 TABLET ORAL
Qty: 30 TABLET | Refills: 0 | Status: SHIPPED | OUTPATIENT
Start: 2025-02-06 | End: 2025-03-08

## 2025-03-04 ENCOUNTER — APPOINTMENT (OUTPATIENT)
Dept: PRIMARY CARE | Facility: CLINIC | Age: 50
End: 2025-03-04
Payer: COMMERCIAL

## 2025-03-04 VITALS
BODY MASS INDEX: 38.11 KG/M2 | SYSTOLIC BLOOD PRESSURE: 100 MMHG | DIASTOLIC BLOOD PRESSURE: 60 MMHG | HEART RATE: 83 BPM | OXYGEN SATURATION: 97 % | HEIGHT: 70 IN | WEIGHT: 266.2 LBS | TEMPERATURE: 97.6 F

## 2025-03-04 DIAGNOSIS — E78.2 MIXED HYPERLIPIDEMIA: ICD-10-CM

## 2025-03-04 DIAGNOSIS — E66.09 CLASS 2 OBESITY DUE TO EXCESS CALORIES WITHOUT SERIOUS COMORBIDITY WITH BODY MASS INDEX (BMI) OF 38.0 TO 38.9 IN ADULT: ICD-10-CM

## 2025-03-04 DIAGNOSIS — F90.0 ATTENTION DEFICIT HYPERACTIVITY DISORDER (ADHD), PREDOMINANTLY INATTENTIVE TYPE: Primary | ICD-10-CM

## 2025-03-04 DIAGNOSIS — E66.812 CLASS 2 OBESITY DUE TO EXCESS CALORIES WITHOUT SERIOUS COMORBIDITY WITH BODY MASS INDEX (BMI) OF 38.0 TO 38.9 IN ADULT: ICD-10-CM

## 2025-03-04 PROCEDURE — 3074F SYST BP LT 130 MM HG: CPT | Performed by: INTERNAL MEDICINE

## 2025-03-04 PROCEDURE — 3078F DIAST BP <80 MM HG: CPT | Performed by: INTERNAL MEDICINE

## 2025-03-04 PROCEDURE — 3008F BODY MASS INDEX DOCD: CPT | Performed by: INTERNAL MEDICINE

## 2025-03-04 PROCEDURE — 1036F TOBACCO NON-USER: CPT | Performed by: INTERNAL MEDICINE

## 2025-03-04 PROCEDURE — 99214 OFFICE O/P EST MOD 30 MIN: CPT | Performed by: INTERNAL MEDICINE

## 2025-03-04 RX ORDER — PHENTERMINE HYDROCHLORIDE 37.5 MG/1
37.5 TABLET ORAL
Qty: 30 TABLET | Refills: 0 | Status: SHIPPED | OUTPATIENT
Start: 2025-03-04 | End: 2025-04-03

## 2025-03-04 ASSESSMENT — PATIENT HEALTH QUESTIONNAIRE - PHQ9
2. FEELING DOWN, DEPRESSED OR HOPELESS: NOT AT ALL
1. LITTLE INTEREST OR PLEASURE IN DOING THINGS: NOT AT ALL
SUM OF ALL RESPONSES TO PHQ9 QUESTIONS 1 AND 2: 0

## 2025-03-04 NOTE — PROGRESS NOTES
Subjective   Patient ID: Calderon Hernandez is a 49 y.o. male who presents for Follow-up (On Adipex-P ).    Assessment/Plan     Problem List Items Addressed This Visit       Class 2 obesity due to excess calories without serious comorbidity with body mass index (BMI) of 38.0 to 38.9 in adult     I personally reviewed the OARRS report for this patient. This report is scanned into the electronic medical record. I have considered  the risks of abuse, dependence, addiction and diversion. After discussion, patient does have a good understanding of the safety and  risks of this medication. I believe that it is clinically appropriate for this patient to be prescribed this medication.  See patient back in 6 weeks.         Relevant Medications    phentermine (Adipex-P) 37.5 mg tablet    Mixed hyperlipidemia     Lipitor 10 mg a check CMP CPK lipid once a year         Attention deficit hyperactivity disorder (ADHD), predominantly inattentive type - Primary     Not suicidal continue Adderall check urine tox screen          Patient was evaluated today, problem list was reviewed, problems and concerns addressed, Rx list reviewed and updated, lab and tests were noted and reviewed. Life style changes were discussed, always it works better if we eat plant based diet and plenty of fibres and roughage. Consume adequate amount of water and avoid alcohol, light to moderate physical activities and stress reduction are always beneficial for ongoing physical well being. Do not forget to have 6 to 7 hours of sleep regularly and avoid late night fredo screen exposure.    HPI 49-year-old patient of anxiety ADD ADHD obesity hypertension hyperlipidemia proteinuria complaining arthralgia myalgia fatigue tired weakness    Negative for headache chest pain seizure    Negative for hematuria rectal bleeding    Try the Wegovy does not get any better    Obesity 1800-calorie diet 10,000 steps a day exercise given phentolamine's follow-up recommend  discussed closely monitor the side effect including the central and cardiovascular side effect.  Past Medical History:   Diagnosis Date    Chronic fatigue 04/06/2023    Closed fracture of transverse process of lumbar vertebra (Multi) 11/03/2019    Fracture of humeral shaft, right, closed 11/02/2019    Personal history of other endocrine, nutritional and metabolic disease 10/25/2021    History of obesity    Plantar fasciitis 10/19/2017    Sprain of anterior talofibular ligament of right ankle 10/19/2017    Tendonitis 10/19/2017    Tinea pedis of right foot 03/26/2018    Unspecified asthma, uncomplicated ([facility]) 10/25/2021    Acute asthmatic bronchitis     Past Surgical History:   Procedure Laterality Date    OTHER SURGICAL HISTORY  10/25/2021    Humerus fracture repair    OTHER SURGICAL HISTORY  10/25/2021    Inguinal hernia repair     No Known Allergies  Current Outpatient Medications   Medication Sig Dispense Refill    amphetamine-dextroamphetamine XR (Adderall XR) 10 mg 24 hr capsule Take 1 capsule (10 mg) by mouth once daily in the morning. Do not crush or chew. 30 capsule 0    atorvastatin (Lipitor) 10 mg tablet Take 1 tablet (10 mg) by mouth once daily at bedtime. 90 tablet 3    olmesartan-hydrochlorothiazide (Benicar HCT) 40-25 mg tablet Take 1 tablet by mouth once daily. 90 tablet 3    phentermine (Adipex-P) 37.5 mg tablet Take 1 tablet (37.5 mg) by mouth once daily in the morning. Take before meals. 30 tablet 0     No current facility-administered medications for this visit.     Family History   Problem Relation Name Age of Onset    Stroke Mother      Other (vascular disorder) Father       Social History     Socioeconomic History    Marital status: Unknown   Tobacco Use    Smoking status: Former     Types: Cigarettes    Smokeless tobacco: Never   Substance and Sexual Activity    Alcohol use: Yes     Comment: social    Drug use: Never     Immunization History   Administered Date(s) Administered     "Influenza, Unspecified 09/29/2010       Review of Systems  Review of systems is otherwise negative unless stated above or in history of present illness.    Objective   Visit Vitals  /60   Pulse 83   Temp 36.4 °C (97.6 °F)   Ht 1.778 m (5' 10\")   Wt 121 kg (266 lb 3.2 oz)   SpO2 97%   BMI 38.20 kg/m²   Smoking Status Former   BSA 2.44 m²     Physical Exam  Constitutional:       General: not in acute distress.   HENT:      Head: Normocephalic and atraumatic.      Nose: Nose normal.   Eyes: No jaundice     Extraocular Movements: Extraocular movements intact.      Conjunctiva/sclera: Conjunctivae normal.   Cardiovascular: Heart murmur     Rate and Rhythm: Normal rate ,  No M/R/G  Pulmonary:      Effort: Pulmonary effort is normal.      Breath sounds: Normal, Bilat Equal AE  Skin:     General: Skin is warm.   Neurological: No headache     Mental Status: He is alert and oriented to person, place, and time.   Psychiatric:         Mood and Affect: Mood normal.         Behavior: Behavior normal.   Musculoskeletal   FROM in all extremitirs,  Joint-no swelling or tenderness    Orders Only on 02/04/2025   Component Date Value Ref Range Status    CHOLESTEROL, TOTAL 02/04/2025 147  <200 mg/dL Final    HDL CHOLESTEROL 02/04/2025 55  > OR = 40 mg/dL Final    TRIGLYCERIDES 02/04/2025 83  <150 mg/dL Final    LDL-CHOLESTEROL 02/04/2025 75  mg/dL (calc) Final    CHOL/HDLC RATIO 02/04/2025 2.7  <5.0 (calc) Final    NON HDL CHOLESTEROL 02/04/2025 92  <130 mg/dL (calc) Final    URIC ACID 02/04/2025 6.2  4.0 - 8.0 mg/dL Final    GLUCOSE 02/04/2025 120 (H)  65 - 99 mg/dL Final    UREA NITROGEN (BUN) 02/04/2025 25  7 - 25 mg/dL Final    CREATININE 02/04/2025 0.86  0.70 - 1.30 mg/dL Final    EGFR 02/04/2025 105  > OR = 60 mL/min/1.73m2 Final    SODIUM 02/04/2025 139  135 - 146 mmol/L Final    POTASSIUM 02/04/2025 3.9  3.5 - 5.3 mmol/L Final    CHLORIDE 02/04/2025 105  98 - 110 mmol/L Final    CARBON DIOXIDE 02/04/2025 25  20 - 32 mmol/L " Final    ELECTROLYTE BALANCE 02/04/2025 9  7 - 17 mmol/L (calc) Final    CALCIUM 02/04/2025 9.4  8.6 - 10.3 mg/dL Final    PROTEIN, TOTAL 02/04/2025 7.0  6.1 - 8.1 g/dL Final    ALBUMIN 02/04/2025 4.7  3.6 - 5.1 g/dL Final    BILIRUBIN, TOTAL 02/04/2025 0.5  0.2 - 1.2 mg/dL Final    ALKALINE PHOSPHATASE 02/04/2025 44  35 - 144 U/L Final    AST 02/04/2025 24  10 - 35 U/L Final    ALT 02/04/2025 46  9 - 46 U/L Final    WHITE BLOOD CELL COUNT 02/04/2025 4.0  3.8 - 10.8 Thousand/uL Final    RED BLOOD CELL COUNT 02/04/2025 5.28  4.20 - 5.80 Million/uL Final    HEMOGLOBIN 02/04/2025 16.3  13.2 - 17.1 g/dL Final    HEMATOCRIT 02/04/2025 49.0  38.5 - 50.0 % Final    MCV 02/04/2025 92.8  80.0 - 100.0 fL Final    MCH 02/04/2025 30.9  27.0 - 33.0 pg Final    MCHC 02/04/2025 33.3  32.0 - 36.0 g/dL Final    RDW 02/04/2025 12.4  11.0 - 15.0 % Final    PLATELET COUNT 02/04/2025 244  140 - 400 Thousand/uL Final    MPV 02/04/2025 10.6  7.5 - 12.5 fL Final    ABSOLUTE NEUTROPHILS 02/04/2025 2,048  1,500 - 7,800 cells/uL Final    ABSOLUTE LYMPHOCYTES 02/04/2025 1,356  850 - 3,900 cells/uL Final    ABSOLUTE MONOCYTES 02/04/2025 388  200 - 950 cells/uL Final    ABSOLUTE EOSINOPHILS 02/04/2025 160  15 - 500 cells/uL Final    ABSOLUTE BASOPHILS 02/04/2025 48  0 - 200 cells/uL Final    NEUTROPHILS 02/04/2025 51.2  % Final    LYMPHOCYTES 02/04/2025 33.9  % Final    MONOCYTES 02/04/2025 9.7  % Final    EOSINOPHILS 02/04/2025 4.0  % Final    BASOPHILS 02/04/2025 1.2  % Final    HEPATITIS C ANTIBODY 02/04/2025 NON-REACTIVE  NON-REACTIVE Final    HIV AG/AB, 4TH GEN 02/04/2025 NON-REACTIVE  NON-REACTIVE Final    TSH W/REFLEX TO FT4 02/04/2025 1.10  0.40 - 4.50 mIU/L Final    Amphetamines 02/04/2025 NEGATIVE  <500 ng/mL Final    Barbiturates 02/04/2025 NEGATIVE  <300 ng/mL Final    Benzodiazepines 02/04/2025 NEGATIVE  <100 ng/mL Final    Cocaine Metabolite 02/04/2025 NEGATIVE  <150 ng/mL Final    Marijuana Metabolite 02/04/2025 NEGATIVE  <20  ng/mL Final    Methadone Metabolite 02/04/2025 NEGATIVE  <100 ng/mL Final    Opiates 02/04/2025 NEGATIVE  <100 ng/mL Final    Oxycodone 02/04/2025 NEGATIVE  <100 ng/mL Final    Phencyclidine 02/04/2025 NEGATIVE  <25 ng/mL Final    Creatinine 02/04/2025 151.1  > or = 20.0 mg/dL Final    pH 02/04/2025 6.7  4.5 - 9.0 Final    Oxidant 02/04/2025 NEGATIVE  <200 mcg/mL Final    Notes and Comments 02/04/2025    Final   Orders Only on 02/04/2025   Component Date Value Ref Range Status    PSA, TOTAL 02/04/2025 0.67  < OR = 4.00 ng/mL Final       Radiology: Reviewed imaging in powerchart.  No results found.      Charting was completed using voice recognition technology and may include unintended errors.

## 2025-04-15 DIAGNOSIS — E66.812 CLASS 2 OBESITY DUE TO EXCESS CALORIES WITHOUT SERIOUS COMORBIDITY WITH BODY MASS INDEX (BMI) OF 38.0 TO 38.9 IN ADULT: ICD-10-CM

## 2025-04-15 DIAGNOSIS — E66.09 CLASS 2 OBESITY DUE TO EXCESS CALORIES WITHOUT SERIOUS COMORBIDITY WITH BODY MASS INDEX (BMI) OF 38.0 TO 38.9 IN ADULT: ICD-10-CM

## 2025-04-15 RX ORDER — PHENTERMINE HYDROCHLORIDE 37.5 MG/1
37.5 TABLET ORAL
Qty: 30 TABLET | Refills: 0 | Status: SHIPPED | OUTPATIENT
Start: 2025-04-15 | End: 2025-04-15 | Stop reason: SDUPTHER

## 2025-04-16 RX ORDER — PHENTERMINE HYDROCHLORIDE 37.5 MG/1
37.5 TABLET ORAL
Qty: 30 TABLET | Refills: 0 | Status: SHIPPED | OUTPATIENT
Start: 2025-04-16 | End: 2025-05-16

## 2025-05-16 ENCOUNTER — APPOINTMENT (OUTPATIENT)
Dept: PRIMARY CARE | Facility: CLINIC | Age: 50
End: 2025-05-16
Payer: COMMERCIAL

## 2025-05-16 VITALS
HEIGHT: 70 IN | SYSTOLIC BLOOD PRESSURE: 119 MMHG | DIASTOLIC BLOOD PRESSURE: 78 MMHG | WEIGHT: 260 LBS | TEMPERATURE: 97 F | HEART RATE: 85 BPM | BODY MASS INDEX: 37.22 KG/M2 | OXYGEN SATURATION: 97 %

## 2025-05-16 DIAGNOSIS — I10 BENIGN ESSENTIAL HYPERTENSION: Primary | ICD-10-CM

## 2025-05-16 DIAGNOSIS — F90.0 ATTENTION DEFICIT HYPERACTIVITY DISORDER (ADHD), PREDOMINANTLY INATTENTIVE TYPE: ICD-10-CM

## 2025-05-16 DIAGNOSIS — E66.09 CLASS 2 OBESITY DUE TO EXCESS CALORIES WITHOUT SERIOUS COMORBIDITY WITH BODY MASS INDEX (BMI) OF 38.0 TO 38.9 IN ADULT: ICD-10-CM

## 2025-05-16 DIAGNOSIS — E78.2 MIXED HYPERLIPIDEMIA: ICD-10-CM

## 2025-05-16 DIAGNOSIS — E66.812 CLASS 2 OBESITY DUE TO EXCESS CALORIES WITHOUT SERIOUS COMORBIDITY WITH BODY MASS INDEX (BMI) OF 38.0 TO 38.9 IN ADULT: ICD-10-CM

## 2025-05-16 DIAGNOSIS — Z00.00 HEALTH CARE MAINTENANCE: ICD-10-CM

## 2025-05-16 LAB
ALBUMIN SERPL-MCNC: 4.7 G/DL (ref 3.6–5.1)
ALP SERPL-CCNC: 43 U/L (ref 35–144)
ALT SERPL-CCNC: 20 U/L (ref 9–46)
ANION GAP SERPL CALCULATED.4IONS-SCNC: 10 MMOL/L (CALC) (ref 7–17)
AST SERPL-CCNC: 15 U/L (ref 10–35)
BILIRUB SERPL-MCNC: 0.4 MG/DL (ref 0.2–1.2)
BUN SERPL-MCNC: 20 MG/DL (ref 7–25)
CALCIUM SERPL-MCNC: 9.1 MG/DL (ref 8.6–10.3)
CHLORIDE SERPL-SCNC: 104 MMOL/L (ref 98–110)
CK SERPL-CCNC: 45 U/L (ref 23–325)
CO2 SERPL-SCNC: 24 MMOL/L (ref 20–32)
CREAT SERPL-MCNC: 0.95 MG/DL (ref 0.7–1.3)
EGFRCR SERPLBLD CKD-EPI 2021: 96 ML/MIN/1.73M2
GLUCOSE SERPL-MCNC: 114 MG/DL (ref 65–99)
POTASSIUM SERPL-SCNC: 4.4 MMOL/L (ref 3.5–5.3)
PROT SERPL-MCNC: 6.9 G/DL (ref 6.1–8.1)
SODIUM SERPL-SCNC: 138 MMOL/L (ref 135–146)

## 2025-05-16 PROCEDURE — 1036F TOBACCO NON-USER: CPT | Performed by: INTERNAL MEDICINE

## 2025-05-16 PROCEDURE — 3074F SYST BP LT 130 MM HG: CPT | Performed by: INTERNAL MEDICINE

## 2025-05-16 PROCEDURE — 3078F DIAST BP <80 MM HG: CPT | Performed by: INTERNAL MEDICINE

## 2025-05-16 PROCEDURE — 3008F BODY MASS INDEX DOCD: CPT | Performed by: INTERNAL MEDICINE

## 2025-05-16 PROCEDURE — 99214 OFFICE O/P EST MOD 30 MIN: CPT | Performed by: INTERNAL MEDICINE

## 2025-05-16 RX ORDER — PHENTERMINE HYDROCHLORIDE 37.5 MG/1
37.5 TABLET ORAL
Qty: 30 TABLET | Refills: 0 | Status: SHIPPED | OUTPATIENT
Start: 2025-05-16 | End: 2025-06-15

## 2025-05-16 RX ORDER — ASPIRIN 81 MG/1
81 TABLET ORAL DAILY
Qty: 90 TABLET | Refills: 3 | Status: SHIPPED | OUTPATIENT
Start: 2025-05-16 | End: 2026-05-16

## 2025-05-16 RX ORDER — DEXTROAMPHETAMINE SACCHARATE, AMPHETAMINE ASPARTATE MONOHYDRATE, DEXTROAMPHETAMINE SULFATE AND AMPHETAMINE SULFATE 2.5; 2.5; 2.5; 2.5 MG/1; MG/1; MG/1; MG/1
10 CAPSULE, EXTENDED RELEASE ORAL EVERY MORNING
Qty: 30 CAPSULE | Refills: 0 | Status: SHIPPED | OUTPATIENT
Start: 2025-05-16 | End: 2025-06-15

## 2025-05-16 ASSESSMENT — PATIENT HEALTH QUESTIONNAIRE - PHQ9
1. LITTLE INTEREST OR PLEASURE IN DOING THINGS: NOT AT ALL
SUM OF ALL RESPONSES TO PHQ9 QUESTIONS 1 AND 2: 0
2. FEELING DOWN, DEPRESSED OR HOPELESS: NOT AT ALL

## 2025-05-16 NOTE — ASSESSMENT & PLAN NOTE
Not suicidal homicidal continue Adderall urine tox screen sign contract I personally reviewed the OARRS report for this patient. This report is scanned into the electronic medical record. I have considered  the risks of abuse, dependence, addiction and diversion. After discussion, patient does have a good understanding of the safety and  risks of this medication. I believe that it is clinically appropriate for this patient to be prescribed this medication.  See patient back in 6 weeks.

## 2025-05-16 NOTE — PROGRESS NOTES
Subjective   Patient ID: Calderon Hernandez is a 50 y.o. male who presents for Follow-up (2 month Adipex- P).    Assessment/Plan     Problem List Items Addressed This Visit       Benign essential hypertension - Primary    Hypertension hyperlipidemia metabolic syndrome with ADD ADHD advised psych evaluation continue Lipitor 10 mg a day Benicar HCTZ 40/20 5 in the morning check BMP uric acid magnesium lipid twice a year         Class 2 obesity due to excess calories without serious comorbidity with body mass index (BMI) of 38.0 to 38.9 in adult    I personally reviewed the OARRS report for this patient. This report is scanned into the electronic medical record. I have considered  the risks of abuse, dependence, addiction and diversion. After discussion, patient does have a good understanding of the safety and  risks of this medication. I believe that it is clinically appropriate for this patient to be prescribed this medication.  See patient back in 6 weeks.         Relevant Medications    phentermine (Adipex-P) 37.5 mg tablet    Other Relevant Orders    Albumin-Creatinine Ratio, Urine Random    Drug Screen, Urine With Reflex to Confirmation    Comprehensive Metabolic Panel    CK    Mixed hyperlipidemia    Attention deficit hyperactivity disorder (ADHD), predominantly inattentive type    Not suicidal homicidal continue Adderall urine tox screen sign contract I personally reviewed the OARRS report for this patient. This report is scanned into the electronic medical record. I have considered  the risks of abuse, dependence, addiction and diversion. After discussion, patient does have a good understanding of the safety and  risks of this medication. I believe that it is clinically appropriate for this patient to be prescribed this medication.  See patient back in 6 weeks.         Relevant Medications    amphetamine-dextroamphetamine XR (Adderall XR) 10 mg 24 hr capsule    Other Relevant Orders    Albumin-Creatinine  "Ratio, Urine Random    Drug Screen, Urine With Reflex to Confirmation    Comprehensive Metabolic Panel    CK     Other Visit Diagnoses         Health care maintenance        Relevant Medications    aspirin 81 mg EC tablet    Other Relevant Orders    Albumin-Creatinine Ratio, Urine Random    Drug Screen, Urine With Reflex to Confirmation    Comprehensive Metabolic Panel    CK            HPI 56-year-old patient have metabolic syndrome hypertension hyperlipidemia obesity ADD ADHD    Negative for headache chest pain hematuria rectal bleeding    Negative for suicide homicide ideation    Review lab medications and contact advised urine tox screen    Advised to come for yearly adult physical checkup    Sent for the blood test urine test follow-up Lost 12 pounds weight feeling better  Medical History[1]  Surgical History[2]  Allergies[3]  Current Medications[4]  Family History[5]  Social History[6]  Immunization History   Administered Date(s) Administered    Influenza, Unspecified 09/29/2010       Review of Systems  Review of systems is otherwise negative unless stated above or in history of present illness.    Objective   Visit Vitals  /78 (BP Location: Left arm, Patient Position: Sitting)   Pulse 85   Temp 36.1 °C (97 °F)   Ht 1.778 m (5' 10\")   Wt 118 kg (260 lb)   SpO2 97%   BMI 37.31 kg/m²   Smoking Status Former   BSA 2.41 m²     Physical Exam  Constitutional: BMI 37     General: not in acute distress.   HENT:      Head: Normocephalic and atraumatic.      Nose: Nose normal.   Eyes: No jaundice     Extraocular Movements: Extraocular movements intact.      Conjunctiva/sclera: Conjunctivae normal.   Cardiovascular: S4     Rate and Rhythm: Normal rate ,  No M/R/G  Pulmonary:      Effort: Pulmonary effort is normal.      Breath sounds: Normal, Bilat Equal AE  Skin:     General: Skin is warm.   Neurological:      Mental Status: He is alert and oriented to person, place, and time.   Psychiatric:    Anxiety     Mood and " Affect: Mood normal.         Behavior: Behavior normal.   Musculoskeletal   FROM in all extremitirs,  Joint-no swelling or tenderness  Hyperglycemia advise low-carb diet keep blood sugar less than 100  Orders Only on 02/04/2025   Component Date Value Ref Range Status    CHOLESTEROL, TOTAL 02/04/2025 147  <200 mg/dL Final    HDL CHOLESTEROL 02/04/2025 55  > OR = 40 mg/dL Final    TRIGLYCERIDES 02/04/2025 83  <150 mg/dL Final    LDL-CHOLESTEROL 02/04/2025 75  mg/dL (calc) Final    CHOL/HDLC RATIO 02/04/2025 2.7  <5.0 (calc) Final    NON HDL CHOLESTEROL 02/04/2025 92  <130 mg/dL (calc) Final    URIC ACID 02/04/2025 6.2  4.0 - 8.0 mg/dL Final    GLUCOSE 02/04/2025 120 (H)  65 - 99 mg/dL Final    UREA NITROGEN (BUN) 02/04/2025 25  7 - 25 mg/dL Final    CREATININE 02/04/2025 0.86  0.70 - 1.30 mg/dL Final    EGFR 02/04/2025 105  > OR = 60 mL/min/1.73m2 Final    SODIUM 02/04/2025 139  135 - 146 mmol/L Final    POTASSIUM 02/04/2025 3.9  3.5 - 5.3 mmol/L Final    CHLORIDE 02/04/2025 105  98 - 110 mmol/L Final    CARBON DIOXIDE 02/04/2025 25  20 - 32 mmol/L Final    ELECTROLYTE BALANCE 02/04/2025 9  7 - 17 mmol/L (calc) Final    CALCIUM 02/04/2025 9.4  8.6 - 10.3 mg/dL Final    PROTEIN, TOTAL 02/04/2025 7.0  6.1 - 8.1 g/dL Final    ALBUMIN 02/04/2025 4.7  3.6 - 5.1 g/dL Final    BILIRUBIN, TOTAL 02/04/2025 0.5  0.2 - 1.2 mg/dL Final    ALKALINE PHOSPHATASE 02/04/2025 44  35 - 144 U/L Final    AST 02/04/2025 24  10 - 35 U/L Final    ALT 02/04/2025 46  9 - 46 U/L Final    WHITE BLOOD CELL COUNT 02/04/2025 4.0  3.8 - 10.8 Thousand/uL Final    RED BLOOD CELL COUNT 02/04/2025 5.28  4.20 - 5.80 Million/uL Final    HEMOGLOBIN 02/04/2025 16.3  13.2 - 17.1 g/dL Final    HEMATOCRIT 02/04/2025 49.0  38.5 - 50.0 % Final    MCV 02/04/2025 92.8  80.0 - 100.0 fL Final    MCH 02/04/2025 30.9  27.0 - 33.0 pg Final    MCHC 02/04/2025 33.3  32.0 - 36.0 g/dL Final    RDW 02/04/2025 12.4  11.0 - 15.0 % Final    PLATELET COUNT 02/04/2025 244  140 -  400 Thousand/uL Final    MPV 02/04/2025 10.6  7.5 - 12.5 fL Final    ABSOLUTE NEUTROPHILS 02/04/2025 2,048  1,500 - 7,800 cells/uL Final    ABSOLUTE LYMPHOCYTES 02/04/2025 1,356  850 - 3,900 cells/uL Final    ABSOLUTE MONOCYTES 02/04/2025 388  200 - 950 cells/uL Final    ABSOLUTE EOSINOPHILS 02/04/2025 160  15 - 500 cells/uL Final    ABSOLUTE BASOPHILS 02/04/2025 48  0 - 200 cells/uL Final    NEUTROPHILS 02/04/2025 51.2  % Final    LYMPHOCYTES 02/04/2025 33.9  % Final    MONOCYTES 02/04/2025 9.7  % Final    EOSINOPHILS 02/04/2025 4.0  % Final    BASOPHILS 02/04/2025 1.2  % Final    HEPATITIS C ANTIBODY 02/04/2025 NON-REACTIVE  NON-REACTIVE Final    HIV AG/AB, 4TH GEN 02/04/2025 NON-REACTIVE  NON-REACTIVE Final    TSH W/REFLEX TO FT4 02/04/2025 1.10  0.40 - 4.50 mIU/L Final    Amphetamines 02/04/2025 NEGATIVE  <500 ng/mL Final    Barbiturates 02/04/2025 NEGATIVE  <300 ng/mL Final    Benzodiazepines 02/04/2025 NEGATIVE  <100 ng/mL Final    Cocaine Metabolite 02/04/2025 NEGATIVE  <150 ng/mL Final    Marijuana Metabolite 02/04/2025 NEGATIVE  <20 ng/mL Final    Methadone Metabolite 02/04/2025 NEGATIVE  <100 ng/mL Final    Opiates 02/04/2025 NEGATIVE  <100 ng/mL Final    Oxycodone 02/04/2025 NEGATIVE  <100 ng/mL Final    Phencyclidine 02/04/2025 NEGATIVE  <25 ng/mL Final    Creatinine 02/04/2025 151.1  > or = 20.0 mg/dL Final    pH 02/04/2025 6.7  4.5 - 9.0 Final    Oxidant 02/04/2025 NEGATIVE  <200 mcg/mL Final    Notes and Comments 02/04/2025    Final   Orders Only on 02/04/2025   Component Date Value Ref Range Status    PSA, TOTAL 02/04/2025 0.67  < OR = 4.00 ng/mL Final       Radiology: Reviewed imaging in powerchart.  Imaging  No results found.    Cardiology, Vascular, and Other Imaging  No other imaging results found for the past 7 days        Charting was completed using voice recognition technology and may include unintended errors.            [1]   Past Medical History:  Diagnosis Date    Chronic fatigue  04/06/2023    Closed fracture of transverse process of lumbar vertebra (Multi) 11/03/2019    Fracture of humeral shaft, right, closed 11/02/2019    Personal history of other endocrine, nutritional and metabolic disease 10/25/2021    History of obesity    Plantar fasciitis 10/19/2017    Sprain of anterior talofibular ligament of right ankle 10/19/2017    Tendonitis 10/19/2017    Tinea pedis of right foot 03/26/2018    Unspecified asthma, uncomplicated ([facility]) 10/25/2021    Acute asthmatic bronchitis   [2]   Past Surgical History:  Procedure Laterality Date    OTHER SURGICAL HISTORY  10/25/2021    Humerus fracture repair    OTHER SURGICAL HISTORY  10/25/2021    Inguinal hernia repair   [3] No Known Allergies  [4]   Current Outpatient Medications   Medication Sig Dispense Refill    atorvastatin (Lipitor) 10 mg tablet Take 1 tablet (10 mg) by mouth once daily at bedtime. 90 tablet 3    olmesartan-hydrochlorothiazide (Benicar HCT) 40-25 mg tablet Take 1 tablet by mouth once daily. 90 tablet 3    amphetamine-dextroamphetamine XR (Adderall XR) 10 mg 24 hr capsule Take 1 capsule (10 mg) by mouth once daily in the morning. Do not crush or chew. 30 capsule 0    aspirin 81 mg EC tablet Take 1 tablet (81 mg) by mouth once daily. 90 tablet 3    phentermine (Adipex-P) 37.5 mg tablet Take 1 tablet (37.5 mg) by mouth once daily in the morning. Take before meals. 30 tablet 0     No current facility-administered medications for this visit.   [5]   Family History  Problem Relation Name Age of Onset    Stroke Mother      Other (vascular disorder) Father     [6]   Social History  Socioeconomic History    Marital status: Unknown   Tobacco Use    Smoking status: Former     Types: Cigarettes    Smokeless tobacco: Never   Substance and Sexual Activity    Alcohol use: Yes     Comment: social    Drug use: Never

## 2025-05-16 NOTE — ASSESSMENT & PLAN NOTE
Hypertension hyperlipidemia metabolic syndrome with ADD ADHD advised psych evaluation continue Lipitor 10 mg a day Benicar HCTZ 40/20 5 in the morning check BMP uric acid magnesium lipid twice a year

## 2025-05-17 LAB
AMPHETAMINES UR QL: NEGATIVE NG/ML
BARBITURATES UR QL: NEGATIVE NG/ML
BENZODIAZ UR QL: NEGATIVE NG/ML
BZE UR QL: NEGATIVE NG/ML
CREAT UR-MCNC: 141.1 MG/DL
FENTANYL UR QL SCN: NEGATIVE NG/ML
METHADONE UR QL: NEGATIVE NG/ML
OPIATES UR QL: NEGATIVE NG/ML
OXIDANTS UR QL: NEGATIVE MCG/ML
OXYCODONE UR QL: NEGATIVE NG/ML
PCP UR QL: NEGATIVE NG/ML
PH UR: 6.9 [PH] (ref 4.5–9)
QUEST NOTES AND COMMENTS: NORMAL
THC UR QL: NEGATIVE NG/ML

## 2025-05-19 ENCOUNTER — TELEPHONE (OUTPATIENT)
Dept: PRIMARY CARE | Facility: CLINIC | Age: 50
End: 2025-05-19
Payer: COMMERCIAL

## 2025-05-19 NOTE — TELEPHONE ENCOUNTER
----- Message from Hugo Galo sent at 5/19/2025 10:12 AM EDT -----  Glucose 114 advise low-carb diet come for yearly adult physical checkup and check the urine protein screening  ----- Message -----  From: [employer] Results In  Sent: 5/16/2025  10:57 PM EDT  To: Hugo Galo MD

## 2025-05-19 NOTE — TELEPHONE ENCOUNTER
----- Message from Hugo Galo sent at 5/19/2025 10:12 AM EDT -----  Glucose 114 advise low-carb diet come for yearly adult physical checkup and check the urine protein screening  ----- Message -----  From: [employer] Results In  Sent: 5/16/2025  10:57 PM EDT  To: Hugo Galo MD

## 2025-06-11 ENCOUNTER — TELEPHONE (OUTPATIENT)
Dept: PRIMARY CARE | Facility: CLINIC | Age: 50
End: 2025-06-11
Payer: COMMERCIAL

## 2025-06-11 NOTE — TELEPHONE ENCOUNTER
Patient has been experiencing back pain, he is not sure if it is a kidney stone. He has a family history of kidney stones

## 2025-06-19 DIAGNOSIS — E66.812 CLASS 2 OBESITY DUE TO EXCESS CALORIES WITHOUT SERIOUS COMORBIDITY WITH BODY MASS INDEX (BMI) OF 38.0 TO 38.9 IN ADULT: ICD-10-CM

## 2025-06-19 DIAGNOSIS — E66.09 CLASS 2 OBESITY DUE TO EXCESS CALORIES WITHOUT SERIOUS COMORBIDITY WITH BODY MASS INDEX (BMI) OF 38.0 TO 38.9 IN ADULT: ICD-10-CM

## 2025-06-20 RX ORDER — PHENTERMINE HYDROCHLORIDE 37.5 MG/1
37.5 TABLET ORAL
Qty: 30 TABLET | Refills: 0 | OUTPATIENT
Start: 2025-06-20 | End: 2025-07-20

## 2025-06-24 ENCOUNTER — APPOINTMENT (OUTPATIENT)
Dept: PRIMARY CARE | Facility: CLINIC | Age: 50
End: 2025-06-24
Payer: COMMERCIAL

## 2025-06-24 VITALS
HEIGHT: 70 IN | SYSTOLIC BLOOD PRESSURE: 111 MMHG | DIASTOLIC BLOOD PRESSURE: 74 MMHG | BODY MASS INDEX: 35.93 KG/M2 | TEMPERATURE: 97 F | HEART RATE: 97 BPM | WEIGHT: 251 LBS | OXYGEN SATURATION: 97 %

## 2025-06-24 DIAGNOSIS — E66.812 CLASS 2 OBESITY DUE TO EXCESS CALORIES WITHOUT SERIOUS COMORBIDITY WITH BODY MASS INDEX (BMI) OF 38.0 TO 38.9 IN ADULT: ICD-10-CM

## 2025-06-24 DIAGNOSIS — E66.09 CLASS 2 OBESITY DUE TO EXCESS CALORIES WITHOUT SERIOUS COMORBIDITY WITH BODY MASS INDEX (BMI) OF 38.0 TO 38.9 IN ADULT: ICD-10-CM

## 2025-06-24 DIAGNOSIS — R63.5 UNINTENDED WEIGHT GAIN: Primary | ICD-10-CM

## 2025-06-24 DIAGNOSIS — I10 BENIGN ESSENTIAL HYPERTENSION: ICD-10-CM

## 2025-06-24 DIAGNOSIS — E78.2 MIXED HYPERLIPIDEMIA: ICD-10-CM

## 2025-06-24 PROCEDURE — 99213 OFFICE O/P EST LOW 20 MIN: CPT | Performed by: FAMILY MEDICINE

## 2025-06-24 PROCEDURE — 3078F DIAST BP <80 MM HG: CPT | Performed by: FAMILY MEDICINE

## 2025-06-24 PROCEDURE — 3074F SYST BP LT 130 MM HG: CPT | Performed by: FAMILY MEDICINE

## 2025-06-24 PROCEDURE — 3008F BODY MASS INDEX DOCD: CPT | Performed by: FAMILY MEDICINE

## 2025-06-24 PROCEDURE — 1036F TOBACCO NON-USER: CPT | Performed by: FAMILY MEDICINE

## 2025-06-24 RX ORDER — PHENTERMINE HYDROCHLORIDE 37.5 MG/1
37.5 TABLET ORAL
Qty: 30 TABLET | Refills: 0 | Status: SHIPPED | OUTPATIENT
Start: 2025-06-24 | End: 2025-07-24

## 2025-06-24 NOTE — PROGRESS NOTES
"Subjective   Patient ID: Calderon Hernandez is a 50 y.o. male who presents for Follow-up (Adipex-P refill ).    Assessment/Plan     Problem List Items Addressed This Visit       Class 2 obesity due to excess calories without serious comorbidity with body mass index (BMI) of 38.0 to 38.9 in adult    Relevant Medications    phentermine (Adipex-P) 37.5 mg tablet       HPI    50-year-old male here for Adipex refill  Watching his diet lost weight loss more than 5% of body weight in 3 months    Up-to-date with urine drug screen controlled substance contract [place] checked      Tolerating medication well without any side effects    Will provide refill    Allergies[1]    Current Medications[2]    Objective   Visit Vitals  /74 (BP Location: Left arm, Patient Position: Sitting)   Pulse 97   Temp 36.1 °C (97 °F)   Ht 1.778 m (5' 10\")   Wt 114 kg (251 lb)   SpO2 97%   BMI 36.01 kg/m²   Smoking Status Former   BSA 2.37 m²     Physical Exam  Constitutional:       General: He is not in acute distress.     Appearance: Normal appearance.   HENT:      Head: Normocephalic and atraumatic.      Nose: Nose normal.   Eyes:      Extraocular Movements: Extraocular movements intact.      Conjunctiva/sclera: Conjunctivae normal.   Cardiovascular:      Rate and Rhythm: Normal rate and regular rhythm.   Pulmonary:      Effort: Pulmonary effort is normal.      Breath sounds: Normal breath sounds.   Skin:     General: Skin is warm.   Neurological:      Mental Status: He is alert and oriented to person, place, and time.   Psychiatric:         Mood and Affect: Mood normal.         Behavior: Behavior normal.   Immunization History   Administered Date(s) Administered    Influenza, Unspecified 09/29/2010       Review of Systems    Office Visit on 05/16/2025   Component Date Value Ref Range Status    Fentanyl 05/16/2025 NEGATIVE  <0.5 ng/mL Final    Amphetamines 05/16/2025 NEGATIVE  <500 ng/mL Final    Barbiturates 05/16/2025 NEGATIVE  <300 ng/mL " Final    Benzodiazepines 05/16/2025 NEGATIVE  <100 ng/mL Final    Cocaine Metabolite 05/16/2025 NEGATIVE  <150 ng/mL Final    Marijuana Metabolite 05/16/2025 NEGATIVE  <20 ng/mL Final    Methadone Metabolite 05/16/2025 NEGATIVE  <100 ng/mL Final    Opiates 05/16/2025 NEGATIVE  <100 ng/mL Final    Oxycodone 05/16/2025 NEGATIVE  <100 ng/mL Final    Phencyclidine 05/16/2025 NEGATIVE  <25 ng/mL Final    Creatinine 05/16/2025 141.1  > or = 20.0 mg/dL Final    pH 05/16/2025 6.9  4.5 - 9.0 Final    Oxidant 05/16/2025 NEGATIVE  <200 mcg/mL Final    Notes and Comments 05/16/2025    Final    GLUCOSE 05/16/2025 114 (H)  65 - 99 mg/dL Final    UREA NITROGEN (BUN) 05/16/2025 20  7 - 25 mg/dL Final    CREATININE 05/16/2025 0.95  0.70 - 1.30 mg/dL Final    EGFR 05/16/2025 96  > OR = 60 mL/min/1.73m2 Final    SODIUM 05/16/2025 138  135 - 146 mmol/L Final    POTASSIUM 05/16/2025 4.4  3.5 - 5.3 mmol/L Final    CHLORIDE 05/16/2025 104  98 - 110 mmol/L Final    CARBON DIOXIDE 05/16/2025 24  20 - 32 mmol/L Final    ELECTROLYTE BALANCE 05/16/2025 10  7 - 17 mmol/L (calc) Final    CALCIUM 05/16/2025 9.1  8.6 - 10.3 mg/dL Final    PROTEIN, TOTAL 05/16/2025 6.9  6.1 - 8.1 g/dL Final    ALBUMIN 05/16/2025 4.7  3.6 - 5.1 g/dL Final    BILIRUBIN, TOTAL 05/16/2025 0.4  0.2 - 1.2 mg/dL Final    ALKALINE PHOSPHATASE 05/16/2025 43  35 - 144 U/L Final    AST 05/16/2025 15  10 - 35 U/L Final    ALT 05/16/2025 20  9 - 46 U/L Final    CREATINE KINASE, TOTAL 05/16/2025 45  23 - 325 U/L Final       Radiology: Reviewed imaging in powerchart.  Imaging  No results found.    Cardiology, Vascular, and Other Imaging  No other imaging results found for the past 7 days      Family History[3]  Social History[4]  Medical History[5]  Surgical History[6]    Charting was completed using voice recognition technology and may include unintended errors.            [1] No Known Allergies  [2]   Current Outpatient Medications   Medication Sig Dispense Refill     amphetamine-dextroamphetamine XR (Adderall XR) 10 mg 24 hr capsule Take 1 capsule (10 mg) by mouth once daily in the morning. Do not crush or chew. 30 capsule 0    aspirin 81 mg EC tablet Take 1 tablet (81 mg) by mouth once daily. 90 tablet 3    atorvastatin (Lipitor) 10 mg tablet Take 1 tablet (10 mg) by mouth once daily at bedtime. 90 tablet 3    olmesartan-hydrochlorothiazide (Benicar HCT) 40-25 mg tablet Take 1 tablet by mouth once daily. 90 tablet 3    phentermine (Adipex-P) 37.5 mg tablet Take 1 tablet (37.5 mg) by mouth once daily in the morning. Take before meals. 30 tablet 0     No current facility-administered medications for this visit.   [3]   Family History  Problem Relation Name Age of Onset    Stroke Mother      Other (vascular disorder) Father     [4]   Social History  Socioeconomic History    Marital status: Unknown   Tobacco Use    Smoking status: Former     Types: Cigarettes    Smokeless tobacco: Never   Substance and Sexual Activity    Alcohol use: Yes     Comment: social    Drug use: Never   [5]   Past Medical History:  Diagnosis Date    Chronic fatigue 04/06/2023    Closed fracture of transverse process of lumbar vertebra (Multi) 11/03/2019    Fracture of humeral shaft, right, closed 11/02/2019    Personal history of other endocrine, nutritional and metabolic disease 10/25/2021    History of obesity    Plantar fasciitis 10/19/2017    Sprain of anterior talofibular ligament of right ankle 10/19/2017    Tendonitis 10/19/2017    Tinea pedis of right foot 03/26/2018    Unspecified asthma, uncomplicated ([facility]) 10/25/2021    Acute asthmatic bronchitis   [6]   Past Surgical History:  Procedure Laterality Date    OTHER SURGICAL HISTORY  10/25/2021    Humerus fracture repair    OTHER SURGICAL HISTORY  10/25/2021    Inguinal hernia repair

## 2025-07-21 ENCOUNTER — APPOINTMENT (OUTPATIENT)
Dept: PRIMARY CARE | Facility: CLINIC | Age: 50
End: 2025-07-21
Payer: COMMERCIAL

## 2025-08-15 DIAGNOSIS — E66.812 CLASS 2 OBESITY DUE TO EXCESS CALORIES WITHOUT SERIOUS COMORBIDITY WITH BODY MASS INDEX (BMI) OF 38.0 TO 38.9 IN ADULT: ICD-10-CM

## 2025-08-15 DIAGNOSIS — E66.09 CLASS 2 OBESITY DUE TO EXCESS CALORIES WITHOUT SERIOUS COMORBIDITY WITH BODY MASS INDEX (BMI) OF 38.0 TO 38.9 IN ADULT: ICD-10-CM

## 2025-08-18 RX ORDER — PHENTERMINE HYDROCHLORIDE 37.5 MG/1
37.5 TABLET ORAL
Qty: 30 TABLET | Refills: 0 | Status: SHIPPED | OUTPATIENT
Start: 2025-08-18 | End: 2025-09-17